# Patient Record
Sex: FEMALE | Race: WHITE | Employment: UNEMPLOYED | URBAN - METROPOLITAN AREA
[De-identification: names, ages, dates, MRNs, and addresses within clinical notes are randomized per-mention and may not be internally consistent; named-entity substitution may affect disease eponyms.]

---

## 2017-01-21 ENCOUNTER — OFFICE VISIT (OUTPATIENT)
Dept: OBGYN CLINIC | Facility: CLINIC | Age: 22
End: 2017-01-21

## 2017-01-21 ENCOUNTER — APPOINTMENT (OUTPATIENT)
Dept: LAB | Facility: HOSPITAL | Age: 22
End: 2017-01-21
Attending: OBSTETRICS & GYNECOLOGY
Payer: COMMERCIAL

## 2017-01-21 VITALS — DIASTOLIC BLOOD PRESSURE: 76 MMHG | SYSTOLIC BLOOD PRESSURE: 131 MMHG

## 2017-01-21 DIAGNOSIS — R10.2 PELVIC PAIN: ICD-10-CM

## 2017-01-21 DIAGNOSIS — R10.2 PELVIC PAIN: Primary | ICD-10-CM

## 2017-01-21 DIAGNOSIS — N92.6 MISSED MENSES: ICD-10-CM

## 2017-01-21 PROBLEM — N30.90 CYSTITIS: Status: ACTIVE | Noted: 2017-01-21

## 2017-01-21 LAB
APPEARANCE: CLEAR
BACTERIA UR QL AUTO: NEGATIVE /HPF
BILIRUB UR QL: NEGATIVE
BILIRUBIN: NEGATIVE
COLOR UR: YELLOW
CONTROL LINE PRESENT WITH A CLEAR BACKGROUND (YES/NO): YES YES/NO
GLUCOSE (URINE DIPSTICK): NEGATIVE MG/DL
GLUCOSE UR-MCNC: NEGATIVE MG/DL
KETONES (URINE DIPSTICK): NEGATIVE MG/DL
KETONES UR-MCNC: NEGATIVE MG/DL
KIT LOT #: NORMAL NUMERIC
MULTISTIX LOT#: NORMAL NUMERIC
NITRITE UR QL STRIP.AUTO: NEGATIVE
NITRITE, URINE: NEGATIVE
PH UR: 6 [PH] (ref 5–8)
PH, URINE: 6.5 (ref 4.5–8)
PREGNANCY TEST, URINE: POSITIVE
PROT UR-MCNC: NEGATIVE MG/DL
PROTEIN (URINE DIPSTICK): NEGATIVE MG/DL
RBC #/AREA URNS AUTO: 5 /HPF
SP GR UR STRIP: 1.01 (ref 1–1.03)
SPECIFIC GRAVITY: 1.01 (ref 1–1.03)
URINE-COLOR: YELLOW
UROBILINOGEN UR STRIP-ACNC: <2
UROBILINOGEN,SEMI-QN: 0 MG/DL (ref 0–1.9)
VIT C UR-MCNC: NEGATIVE MG/DL
WBC #/AREA URNS AUTO: 4 /HPF

## 2017-01-21 PROCEDURE — 87186 SC STD MICRODIL/AGAR DIL: CPT

## 2017-01-21 PROCEDURE — 81025 URINE PREGNANCY TEST: CPT | Performed by: OBSTETRICS & GYNECOLOGY

## 2017-01-21 PROCEDURE — 81001 URINALYSIS AUTO W/SCOPE: CPT

## 2017-01-21 PROCEDURE — 81002 URINALYSIS NONAUTO W/O SCOPE: CPT | Performed by: OBSTETRICS & GYNECOLOGY

## 2017-01-21 PROCEDURE — 99213 OFFICE O/P EST LOW 20 MIN: CPT | Performed by: OBSTETRICS & GYNECOLOGY

## 2017-01-21 PROCEDURE — 87086 URINE CULTURE/COLONY COUNT: CPT

## 2017-01-21 PROCEDURE — 87077 CULTURE AEROBIC IDENTIFY: CPT

## 2017-01-21 NOTE — PROGRESS NOTES
HPI:    Patient ID: Lopez Horan is a 24year old female. HPI  3 month follow-up visit  Had acute cystitis at her last visit and was complaining of dyspareunia and coital bleeding. Says that she felt much better and the symptoms resolved.   Rece

## 2017-01-23 ENCOUNTER — TELEPHONE (OUTPATIENT)
Dept: OBGYN CLINIC | Facility: CLINIC | Age: 22
End: 2017-01-23

## 2017-01-23 RX ORDER — NITROFURANTOIN 25; 75 MG/1; MG/1
100 CAPSULE ORAL 2 TIMES DAILY
Qty: 10 CAPSULE | Refills: 0 | Status: SHIPPED | OUTPATIENT
Start: 2017-01-23 | End: 2017-01-30

## 2017-01-23 NOTE — TELEPHONE ENCOUNTER
Please inform patient that her urine culture shows that the bacteria causing her urinary tract infection is not sensitive to Bactrim. She will need to stop and take a different antibiotic. This has been sent to her pharmacy.

## 2017-01-24 RX ORDER — ASCORBIC ACID, CHOLECALCIFEROL, .ALPHA.-TOCOPHEROL, DL-, PYRIDOXINE HYDROCHLORIDE, FOLIC ACID, CYANOCOBALAMIN, CALCIUM CARBONATE, FERROUS FUMARATE, MAGNESIUM OXIDE AND DOCONEXENT 90; 220; 10; 26; 1; 13; 145; 28; 50; 300 MG/1; [IU]/1; [IU]/1; MG/1; MG/1; UG/1; MG/1; MG/1; MG/1; MG/1
1 CAPSULE, GELATIN COATED ORAL DAILY
Qty: 30 CAPSULE | Refills: 11 | Status: SHIPPED | OUTPATIENT
Start: 2017-01-24 | End: 2017-02-23

## 2017-01-25 ENCOUNTER — TELEPHONE (OUTPATIENT)
Dept: OBGYN CLINIC | Facility: CLINIC | Age: 22
End: 2017-01-25

## 2017-01-25 NOTE — TELEPHONE ENCOUNTER
Anita Ndiaye MD  [facility]                   Please inform urinalysis suggests UTI.  Recommend waiting urine culture as she is early pregnant in order to confirm infection versus contamination.

## 2017-02-02 ENCOUNTER — TELEPHONE (OUTPATIENT)
Dept: OBGYN CLINIC | Facility: CLINIC | Age: 22
End: 2017-02-02

## 2017-03-07 ENCOUNTER — TELEPHONE (OUTPATIENT)
Dept: OBGYN CLINIC | Facility: CLINIC | Age: 22
End: 2017-03-07

## 2017-03-07 NOTE — TELEPHONE ENCOUNTER
Pt was at the er last night, was told her  baby has not heart beat, 10 weeks pregnant, has cramps.  Pt is crying, would like to start the process asap

## 2017-03-08 ENCOUNTER — OFFICE VISIT (OUTPATIENT)
Dept: OBGYN CLINIC | Facility: CLINIC | Age: 22
End: 2017-03-08

## 2017-03-08 VITALS — WEIGHT: 165 LBS | HEART RATE: 71 BPM | SYSTOLIC BLOOD PRESSURE: 119 MMHG | DIASTOLIC BLOOD PRESSURE: 72 MMHG

## 2017-03-08 DIAGNOSIS — O02.1 MISSED ABORTION: Primary | ICD-10-CM

## 2017-03-08 PROCEDURE — 99213 OFFICE O/P EST LOW 20 MIN: CPT | Performed by: OBSTETRICS & GYNECOLOGY

## 2017-03-08 RX ORDER — CEPHALEXIN 500 MG/1
500 CAPSULE ORAL 4 TIMES DAILY
COMMUNITY
End: 2017-03-17 | Stop reason: ALTCHOICE

## 2017-03-08 RX ORDER — NITROFURANTOIN 25; 75 MG/1; MG/1
100 CAPSULE ORAL 2 TIMES DAILY
COMMUNITY
End: 2017-03-17 | Stop reason: ALTCHOICE

## 2017-03-08 NOTE — PROGRESS NOTES
HPI:    Patient ID: Serene Clement is a 24year old female. HPI  Emergency room follow-up  Primigravida approximately 10-11 weeks gestational age who had confirmed an early pregnancy in January but did not return for ongoing prenatal care with us.

## 2017-03-10 ENCOUNTER — TELEPHONE (OUTPATIENT)
Dept: OBGYN CLINIC | Facility: CLINIC | Age: 22
End: 2017-03-10

## 2017-03-10 ENCOUNTER — APPOINTMENT (OUTPATIENT)
Dept: LAB | Facility: HOSPITAL | Age: 22
End: 2017-03-10
Attending: OBSTETRICS & GYNECOLOGY
Payer: COMMERCIAL

## 2017-03-10 DIAGNOSIS — O02.1 MISSED ABORTION: ICD-10-CM

## 2017-03-10 DIAGNOSIS — O02.1 MISSED ABORTION: Primary | ICD-10-CM

## 2017-03-10 LAB
ANTIBODY SCREEN: NEGATIVE
RH BLOOD TYPE: POSITIVE

## 2017-03-10 PROCEDURE — 86901 BLOOD TYPING SEROLOGIC RH(D): CPT

## 2017-03-10 PROCEDURE — 86900 BLOOD TYPING SEROLOGIC ABO: CPT

## 2017-03-10 PROCEDURE — 86850 RBC ANTIBODY SCREEN: CPT

## 2017-03-10 PROCEDURE — 36415 COLL VENOUS BLD VENIPUNCTURE: CPT

## 2017-03-10 NOTE — TELEPHONE ENCOUNTER
Note we requested records from ER visit [facility]. We needed the results same day in order to determine blood type and other information. I do not see that they were faxed.   If we cannot get ABO/Rh information today, patient will need t

## 2017-03-10 NOTE — TELEPHONE ENCOUNTER
Since records were never received, ordered blood tests and informed pt via interpretor she needs to go today for blood work, Patient informed and verbalized understanding.

## 2017-03-16 ENCOUNTER — OFFICE VISIT (OUTPATIENT)
Dept: OBGYN CLINIC | Facility: CLINIC | Age: 22
End: 2017-03-16

## 2017-03-16 VITALS — DIASTOLIC BLOOD PRESSURE: 84 MMHG | SYSTOLIC BLOOD PRESSURE: 126 MMHG | HEART RATE: 86 BPM | WEIGHT: 166 LBS

## 2017-03-16 DIAGNOSIS — O02.1 MISSED ABORTION: Primary | ICD-10-CM

## 2017-03-16 PROCEDURE — 99213 OFFICE O/P EST LOW 20 MIN: CPT | Performed by: OBSTETRICS & GYNECOLOGY

## 2017-03-16 PROCEDURE — 81025 URINE PREGNANCY TEST: CPT | Performed by: OBSTETRICS & GYNECOLOGY

## 2017-03-16 NOTE — PROGRESS NOTES
HPI:    Patient ID: Kaley Client is a 24year old female. HPI  F/u missed ab. Rh positive  States passed tissue and clots 5 days ago. Cramping and bleeding have stopped.   Review of Systems           Current Outpatient Prescriptions:  Jabier Mcgrath

## 2017-03-17 ENCOUNTER — APPOINTMENT (OUTPATIENT)
Dept: LAB | Facility: HOSPITAL | Age: 22
End: 2017-03-17
Attending: OBSTETRICS & GYNECOLOGY
Payer: COMMERCIAL

## 2017-03-17 ENCOUNTER — OFFICE VISIT (OUTPATIENT)
Dept: OBGYN CLINIC | Facility: CLINIC | Age: 22
End: 2017-03-17

## 2017-03-17 VITALS — DIASTOLIC BLOOD PRESSURE: 64 MMHG | SYSTOLIC BLOOD PRESSURE: 100 MMHG

## 2017-03-17 DIAGNOSIS — O03.9 SPONTANEOUS MISCARRIAGE: Primary | ICD-10-CM

## 2017-03-17 DIAGNOSIS — O03.9 SPONTANEOUS MISCARRIAGE: ICD-10-CM

## 2017-03-17 LAB — B-HCG SERPL-ACNC: 232.4 MIU/ML

## 2017-03-17 PROCEDURE — 76857 US EXAM PELVIC LIMITED: CPT | Performed by: OBSTETRICS & GYNECOLOGY

## 2017-03-17 PROCEDURE — 84702 CHORIONIC GONADOTROPIN TEST: CPT

## 2017-03-17 PROCEDURE — 99212 OFFICE O/P EST SF 10 MIN: CPT | Performed by: OBSTETRICS & GYNECOLOGY

## 2017-03-17 PROCEDURE — 36415 COLL VENOUS BLD VENIPUNCTURE: CPT

## 2017-03-17 NOTE — PROGRESS NOTES
HPI:    Patient ID: Serene Clement is a 24year old female. HPI  Feeling well. Denies cramping. Noted very slight vaginal brown spotting after exam yesterday. Presents for transvaginal ultrasound post miscarriage.   Had positive pregnancy test y

## 2017-03-25 ENCOUNTER — LAB ENCOUNTER (OUTPATIENT)
Dept: LAB | Facility: HOSPITAL | Age: 22
End: 2017-03-25
Attending: OBSTETRICS & GYNECOLOGY
Payer: COMMERCIAL

## 2017-03-25 DIAGNOSIS — O03.9 SPONTANEOUS MISCARRIAGE: ICD-10-CM

## 2017-03-25 LAB
B-HCG SERPL-ACNC: 29.4 MIU/ML
B-HCG UR QL: NEGATIVE

## 2017-03-25 PROCEDURE — 36415 COLL VENOUS BLD VENIPUNCTURE: CPT

## 2017-03-25 PROCEDURE — 84702 CHORIONIC GONADOTROPIN TEST: CPT

## 2017-03-25 PROCEDURE — 81025 URINE PREGNANCY TEST: CPT

## 2017-03-27 ENCOUNTER — TELEPHONE (OUTPATIENT)
Dept: OBGYN CLINIC | Facility: CLINIC | Age: 22
End: 2017-03-27

## 2017-03-27 DIAGNOSIS — O03.9 SAB (SPONTANEOUS ABORTION): Primary | ICD-10-CM

## 2017-03-27 NOTE — TELEPHONE ENCOUNTER
----- Message from Arlene Maloney MD sent at 3/27/2017  2:06 PM CDT -----  Please inform q bhcg level is low but not negative yet.   Repeat in one week until neg

## 2017-03-28 LAB
CONTROL LINE PRESENT WITH A CLEAR BACKGROUND (YES/NO): YES YES/NO
KIT LOT #: NORMAL NUMERIC
PREGNANCY TEST, URINE: POSITIVE

## 2017-03-28 NOTE — TELEPHONE ENCOUNTER
Pt called using Nicaraguan phone line , Nayla Montgomery #[phone]. Informed pt that her bchg level from 03/25 was low, but not negative. Informed pt she needs to have hcgb test repeated in 1 week (04/01/2017) at [address] [facility] [address] for Health lab.  Pt verbalizes understanding

## 2017-04-01 ENCOUNTER — APPOINTMENT (OUTPATIENT)
Dept: LAB | Facility: HOSPITAL | Age: 22
End: 2017-04-01
Attending: OBSTETRICS & GYNECOLOGY
Payer: COMMERCIAL

## 2017-04-01 DIAGNOSIS — O03.9 SAB (SPONTANEOUS ABORTION): ICD-10-CM

## 2017-04-01 PROCEDURE — 84702 CHORIONIC GONADOTROPIN TEST: CPT

## 2017-04-01 PROCEDURE — 36415 COLL VENOUS BLD VENIPUNCTURE: CPT

## 2017-04-03 DIAGNOSIS — O03.9 SPONTANEOUS ABORTION IN FIRST TRIMESTER: Primary | ICD-10-CM

## 2017-05-23 ENCOUNTER — TELEPHONE (OUTPATIENT)
Dept: OBGYN CLINIC | Facility: CLINIC | Age: 22
End: 2017-05-23

## 2018-03-05 ENCOUNTER — OFFICE VISIT (OUTPATIENT)
Dept: OBGYN CLINIC | Facility: CLINIC | Age: 23
End: 2018-03-05

## 2018-03-05 ENCOUNTER — APPOINTMENT (OUTPATIENT)
Dept: LAB | Facility: HOSPITAL | Age: 23
End: 2018-03-05
Attending: OBSTETRICS & GYNECOLOGY
Payer: COMMERCIAL

## 2018-03-05 VITALS
HEIGHT: 64 IN | BODY MASS INDEX: 31.24 KG/M2 | SYSTOLIC BLOOD PRESSURE: 118 MMHG | DIASTOLIC BLOOD PRESSURE: 74 MMHG | WEIGHT: 183 LBS

## 2018-03-05 DIAGNOSIS — N92.6 MISSED MENSES: ICD-10-CM

## 2018-03-05 DIAGNOSIS — O03.9 SPONTANEOUS ABORTION IN FIRST TRIMESTER: ICD-10-CM

## 2018-03-05 DIAGNOSIS — N92.6 MISSED MENSES: Primary | ICD-10-CM

## 2018-03-05 LAB
B-HCG SERPL-ACNC: 9009 MIU/ML
CONTROL LINE PRESENT WITH A CLEAR BACKGROUND (YES/NO): YES YES/NO
KIT LOT #: NORMAL NUMERIC
PREGNANCY TEST, URINE: POSITIVE

## 2018-03-05 PROCEDURE — 99213 OFFICE O/P EST LOW 20 MIN: CPT | Performed by: ADVANCED PRACTICE MIDWIFE

## 2018-03-05 PROCEDURE — 36415 COLL VENOUS BLD VENIPUNCTURE: CPT

## 2018-03-05 PROCEDURE — 87086 URINE CULTURE/COLONY COUNT: CPT

## 2018-03-05 PROCEDURE — 84702 CHORIONIC GONADOTROPIN TEST: CPT

## 2018-03-05 PROCEDURE — 81025 URINE PREGNANCY TEST: CPT | Performed by: ADVANCED PRACTICE MIDWIFE

## 2018-03-05 NOTE — PROGRESS NOTES
HPI:    Patient ID: Kelsie Car is a 20year old female. . LMP 18 6 wk 2 days SHAHEED 10/27/18. Is having some nose bleeds, mild cramps and spotting pink yesterday. Gets some headaches.   Does not have N&V or tiredness or breast tend

## 2018-03-06 ENCOUNTER — TELEPHONE (OUTPATIENT)
Dept: OBGYN CLINIC | Facility: CLINIC | Age: 23
End: 2018-03-06

## 2018-03-06 NOTE — TELEPHONE ENCOUNTER
Please inform her blood pregnancy hormone test , qbhcg is in a healthy level. Please schedule for OB u/s 1 in office with me, next available opening.

## 2018-03-15 ENCOUNTER — TELEPHONE (OUTPATIENT)
Dept: OBGYN CLINIC | Facility: CLINIC | Age: 23
End: 2018-03-15

## 2018-03-15 NOTE — TELEPHONE ENCOUNTER
Give her appt with me tomorrow morning 9 am.  If she doesn't want to wait, can order stat hold and call u/s through [address] [place] [address] but will be done by tech and not in office setting.

## 2018-03-15 NOTE — TELEPHONE ENCOUNTER
Per pt wondering if she can be seen for her US with someone else. States that two days ago she was spotting but did not call due to apt today with QUIRINO.

## 2018-03-15 NOTE — TELEPHONE ENCOUNTER
Called pt to r/s appt due to Dr Martha Ortiz not being in office (2 pts in labor). Dr Martha Ortiz has no availabity for u/s until next week. Pt would like to know if she can have u/s done at hospital due to bleeding during pregnancy.

## 2018-03-16 ENCOUNTER — OFFICE VISIT (OUTPATIENT)
Dept: OBGYN CLINIC | Facility: CLINIC | Age: 23
End: 2018-03-16

## 2018-03-16 ENCOUNTER — APPOINTMENT (OUTPATIENT)
Dept: LAB | Facility: HOSPITAL | Age: 23
End: 2018-03-16
Attending: OBSTETRICS & GYNECOLOGY
Payer: COMMERCIAL

## 2018-03-16 VITALS — SYSTOLIC BLOOD PRESSURE: 122 MMHG | DIASTOLIC BLOOD PRESSURE: 70 MMHG

## 2018-03-16 DIAGNOSIS — O20.0 THREATENED ABORTION, ANTEPARTUM: ICD-10-CM

## 2018-03-16 DIAGNOSIS — N92.6 MISSED MENSES: Primary | ICD-10-CM

## 2018-03-16 DIAGNOSIS — O03.9 SPONTANEOUS MISCARRIAGE: ICD-10-CM

## 2018-03-16 LAB — B-HCG SERPL-ACNC: NORMAL MIU/ML

## 2018-03-16 PROCEDURE — 84702 CHORIONIC GONADOTROPIN TEST: CPT

## 2018-03-16 PROCEDURE — 36415 COLL VENOUS BLD VENIPUNCTURE: CPT

## 2018-03-16 PROCEDURE — 76817 TRANSVAGINAL US OBSTETRIC: CPT | Performed by: OBSTETRICS & GYNECOLOGY

## 2018-03-16 PROCEDURE — 99212 OFFICE O/P EST SF 10 MIN: CPT | Performed by: OBSTETRICS & GYNECOLOGY

## 2018-03-16 NOTE — PROGRESS NOTES
HPI:    Patient ID: Sarah Barrera is a 20year old female. HPI  For early u/s 1. Had normal qbhcg 9000. Hx of miscarriage w first preg. Scant brown spotting on wiping 2 days ago. No cramping or continued bleeding.   US shows 6 4/7 wks single l

## 2018-03-23 ENCOUNTER — NURSE ONLY (OUTPATIENT)
Dept: OBGYN CLINIC | Facility: CLINIC | Age: 23
End: 2018-03-23

## 2018-03-23 ENCOUNTER — LAB ENCOUNTER (OUTPATIENT)
Dept: LAB | Facility: HOSPITAL | Age: 23
End: 2018-03-23
Attending: OBSTETRICS & GYNECOLOGY
Payer: COMMERCIAL

## 2018-03-23 ENCOUNTER — TELEPHONE (OUTPATIENT)
Dept: OBGYN CLINIC | Facility: CLINIC | Age: 23
End: 2018-03-23

## 2018-03-23 DIAGNOSIS — O20.0 THREATENED ABORTION, ANTEPARTUM: ICD-10-CM

## 2018-03-23 DIAGNOSIS — Z34.81 ENCOUNTER FOR SUPERVISION OF OTHER NORMAL PREGNANCY IN FIRST TRIMESTER: ICD-10-CM

## 2018-03-23 DIAGNOSIS — L81.8 DECORATIVE TATTOO: ICD-10-CM

## 2018-03-23 DIAGNOSIS — Z34.81 ENCOUNTER FOR SUPERVISION OF OTHER NORMAL PREGNANCY IN FIRST TRIMESTER: Primary | ICD-10-CM

## 2018-03-23 LAB
ANTIBODY SCREEN: NEGATIVE
BASOPHILS # BLD: 0 K/UL (ref 0–0.2)
BASOPHILS NFR BLD: 0 %
BILIRUB UR QL: NEGATIVE
COLOR UR: YELLOW
EOSINOPHIL # BLD: 0.2 K/UL (ref 0–0.7)
EOSINOPHIL NFR BLD: 2 %
ERYTHROCYTE [DISTWIDTH] IN BLOOD BY AUTOMATED COUNT: 13.2 % (ref 11–15)
GLUCOSE UR-MCNC: NEGATIVE MG/DL
HBV SURFACE AG SERPL QL IA: NONREACTIVE
HCT VFR BLD AUTO: 38.7 % (ref 35–48)
HCV AB SERPL QL IA: NONREACTIVE
HGB BLD-MCNC: 13.1 G/DL (ref 12–16)
HIV1+2 AB SERPL QL IA: NONREACTIVE
KETONES UR-MCNC: NEGATIVE MG/DL
LYMPHOCYTES # BLD: 1.5 K/UL (ref 1–4)
LYMPHOCYTES NFR BLD: 17 %
MCH RBC QN AUTO: 29.5 PG (ref 27–32)
MCHC RBC AUTO-ENTMCNC: 33.9 G/DL (ref 32–37)
MCV RBC AUTO: 87.2 FL (ref 80–100)
MONOCYTES # BLD: 0.5 K/UL (ref 0–1)
MONOCYTES NFR BLD: 5 %
NEUTROPHILS # BLD AUTO: 6.6 K/UL (ref 1.8–7.7)
NEUTROPHILS NFR BLD: 76 %
NITRITE UR QL STRIP.AUTO: NEGATIVE
PH UR: 6 [PH] (ref 5–8)
PLATELET # BLD AUTO: 203 K/UL (ref 140–400)
PMV BLD AUTO: 8.2 FL (ref 7.4–10.3)
PROGEST SERPL-MCNC: 11.5 NG/ML
PROT UR-MCNC: 100 MG/DL
RBC # BLD AUTO: 4.44 M/UL (ref 3.7–5.4)
RBC #/AREA URNS AUTO: 53 /HPF
RH BLOOD TYPE: POSITIVE
RUBV IGG SER-ACNC: 53.5 IU/ML
SP GR UR STRIP: 1.03 (ref 1–1.03)
T PALLIDUM AB SER QL: NEGATIVE
UROBILINOGEN UR STRIP-ACNC: 2
VIT C UR-MCNC: 40 MG/DL
WBC # BLD AUTO: 8.7 K/UL (ref 4–11)
WBC #/AREA URNS AUTO: 67 /HPF

## 2018-03-23 PROCEDURE — 84144 ASSAY OF PROGESTERONE: CPT

## 2018-03-23 PROCEDURE — 36415 COLL VENOUS BLD VENIPUNCTURE: CPT

## 2018-03-23 PROCEDURE — 87086 URINE CULTURE/COLONY COUNT: CPT

## 2018-03-23 PROCEDURE — 86780 TREPONEMA PALLIDUM: CPT

## 2018-03-23 PROCEDURE — 86901 BLOOD TYPING SEROLOGIC RH(D): CPT

## 2018-03-23 PROCEDURE — 85025 COMPLETE CBC W/AUTO DIFF WBC: CPT

## 2018-03-23 PROCEDURE — 81001 URINALYSIS AUTO W/SCOPE: CPT

## 2018-03-23 PROCEDURE — 86762 RUBELLA ANTIBODY: CPT

## 2018-03-23 PROCEDURE — 86803 HEPATITIS C AB TEST: CPT

## 2018-03-23 PROCEDURE — 86850 RBC ANTIBODY SCREEN: CPT

## 2018-03-23 PROCEDURE — 87389 HIV-1 AG W/HIV-1&-2 AB AG IA: CPT

## 2018-03-23 PROCEDURE — 87340 HEPATITIS B SURFACE AG IA: CPT

## 2018-03-23 PROCEDURE — 86900 BLOOD TYPING SEROLOGIC ABO: CPT

## 2018-03-23 RX ORDER — VITAMIN A ACETATE, BETA CAROTENE, ASCORBIC ACID, CHOLECALCIFEROL, .ALPHA.-TOCOPHEROL ACETATE, DL-, THIAMINE MONONITRATE, RIBOFLAVIN, NIACINAMIDE, PYRIDOXINE HYDROCHLORIDE, FOLIC ACID, CYANOCOBALAMIN, CALCIUM CARBONATE, FERROUS FUMARATE, ZINC OXIDE, CUPRIC OXIDE 3080; 12; 120; 400; 1; 1.84; 3; 20; 22; 920; 25; 200; 27; 10; 2 [IU]/1; UG/1; MG/1; [IU]/1; MG/1; MG/1; MG/1; MG/1; MG/1; [IU]/1; MG/1; MG/1; MG/1; MG/1; MG/1
1 TABLET, FILM COATED ORAL DAILY
COMMUNITY
End: 2020-01-21

## 2018-03-23 NOTE — TELEPHONE ENCOUNTER
Patient's insurance, [facility] and Medicaid, does not require referral or authorization for Maternal Fetal Medicine. Thank you.

## 2018-03-23 NOTE — PROGRESS NOTES
Pt is here today for RN [employer] Education.  Educational material reviewed with patient: Prenatal care, nutrition, weight gain recommendations, travel, exercise, intercourse, pregnancy changes, safe medications, pregnancy and work, fetal movement, labor and prete

## 2018-03-27 ENCOUNTER — TELEPHONE (OUTPATIENT)
Dept: OBGYN CLINIC | Facility: CLINIC | Age: 23
End: 2018-03-27

## 2018-03-27 NOTE — TELEPHONE ENCOUNTER
Per pt CRINONE cream isn't covered by insurance and was told to call clinic to get a PA.  Please advise

## 2018-03-28 NOTE — TELEPHONE ENCOUNTER
Your information has been submitted to [facility]. If [facility] has not responded in 3-5 business days or if you have any questions about your PA submission, contact [place] Medicaid at [phone].

## 2018-03-28 NOTE — TELEPHONE ENCOUNTER
Grisel Sanchezhuerta Braun: [id number]      Crinone 8% gel has been REJECTED by insurance. Non-preferred agents typically have a higher patient co-pay than health insurance plan preferred agents. Please research and evaluate therapy options for this patient.

## 2018-03-28 NOTE — TELEPHONE ENCOUNTER
Per pt states that pharmacy informed her that a PA is needed in order for medication to be covered Advised pt that if the PA is needed then we would start one and get back to her.

## 2018-03-29 NOTE — TELEPHONE ENCOUNTER
Crinone is currently not being covered by pt's insurance, pt is requesting to see if there is another rx available that is similar that could be sent in?

## 2018-03-29 NOTE — TELEPHONE ENCOUNTER
Per pharmacist pt picked up prescription and paid the $400 for medication. Informed pharmacist that PA was placed for this medication and if approved to let them know so they can get a hold of insurance.

## 2018-03-29 NOTE — TELEPHONE ENCOUNTER
May send in Generic Progesterone suppositories per vagina BID. Check with Pharmacist dose equivalent to Crinone.

## 2018-04-02 ENCOUNTER — TELEPHONE (OUTPATIENT)
Dept: OBGYN CLINIC | Facility: CLINIC | Age: 23
End: 2018-04-02

## 2018-04-02 NOTE — TELEPHONE ENCOUNTER
Somali phone line  #[phone] used to translate phone call. Pt voices when she places progesterone suppositories she feels period like cramping for a couple of hours after placement and then cramping goes away.  Pt denies any bleeding or any other

## 2018-04-02 NOTE — TELEPHONE ENCOUNTER
Cayman Islander phone line  #[phone] used to translate phone call. Informed pt of Dr. Lawanda Schultz message below. Pt voices understanding.

## 2018-04-03 NOTE — TELEPHONE ENCOUNTER
Fax received. Denial for reasons below. Pt has primary and it needs to go through them first.   Please inform pt. Letter off to scan.

## 2018-04-03 NOTE — TELEPHONE ENCOUNTER
Spoke to rep at [facility] p/a, and they denied the claim due pt having a primary insurance. They asked to bill her primary insurance first. They will fax us the denial letter.

## 2018-04-04 ENCOUNTER — TELEPHONE (OUTPATIENT)
Dept: OBGYN CLINIC | Facility: CLINIC | Age: 23
End: 2018-04-04

## 2018-04-04 NOTE — TELEPHONE ENCOUNTER
Pt. states that she is having vaginal bleeding and that it is alot and is 9 weeks pregnant, so she went to ER Dept at [facility] in [facility]. Pt states that she is taking suppositories, which caused cramping and now the bleeding. Pt.  Wants the Dr to

## 2018-04-05 ENCOUNTER — TELEPHONE (OUTPATIENT)
Dept: OBGYN CLINIC | Facility: CLINIC | Age: 23
End: 2018-04-05

## 2018-04-05 NOTE — TELEPHONE ENCOUNTER
Please follow up with patient. How is she doing. What did they do at [facility] ER and what did they say?

## 2018-04-05 NOTE — TELEPHONE ENCOUNTER
Pt states US was done and everything was within normal limits and nothing concerning was noted. Was informed of medication being sent to pharmacy as well as dc vaginal creams due to vaginal bleeding.

## 2018-04-05 NOTE — TELEPHONE ENCOUNTER
Pt informed to provide pharmacy with insurance information for primary and secondary insurance. Pt already paid for medication and will provide her pharmacy this information to try and be reimbursed. She had no further questions.

## 2018-04-05 NOTE — TELEPHONE ENCOUNTER
Per pt she was seen at [address]  [facility] in [facility] ER due to vag bleeding. Is 9w3d and O positive. Was given forms for miscarriage precautions.  Was given Clindamycin 2% vaginal supp, which she is to insert once daily and also keflex 500mg 4x daily for 7 da

## 2018-04-05 NOTE — TELEPHONE ENCOUNTER
Did she have and ultrasound? What were the results? She should hold off on any vaginal creams given the vaginal bleeding. She can take oral progesterone. ERx Prometrium 100 mg po daily. #20. If ultrasound normal, keep next appt with me.   If she does

## 2018-04-09 ENCOUNTER — TELEPHONE (OUTPATIENT)
Dept: OBGYN CLINIC | Facility: CLINIC | Age: 23
End: 2018-04-09

## 2018-04-09 NOTE — TELEPHONE ENCOUNTER
Per pt states that she is currently 10w0d, O positive. States she has been having some cramping for the past three days 6/10 pain at least every hour. States  she was seen in ER last Wednesday in [facility] due to heavy bleeding.  States that she

## 2018-04-09 NOTE — TELEPHONE ENCOUNTER
Pt is 10 wks pregnant, states still having light bleeding when wiping and cramping that comes and goes. pls adv.

## 2018-04-09 NOTE — TELEPHONE ENCOUNTER
As discussed w triage nurse. Try to schedule prob ob visit for tomorrow. Can always go to ER for severe cramping or heavy vag bleed.

## 2018-04-10 ENCOUNTER — OFFICE VISIT (OUTPATIENT)
Dept: OBGYN CLINIC | Facility: CLINIC | Age: 23
End: 2018-04-10

## 2018-04-10 VITALS
HEART RATE: 79 BPM | WEIGHT: 176.38 LBS | DIASTOLIC BLOOD PRESSURE: 80 MMHG | BODY MASS INDEX: 30 KG/M2 | SYSTOLIC BLOOD PRESSURE: 124 MMHG

## 2018-04-10 DIAGNOSIS — O20.0 THREATENED ABORTION, ANTEPARTUM: Primary | ICD-10-CM

## 2018-04-10 PROCEDURE — 76817 TRANSVAGINAL US OBSTETRIC: CPT | Performed by: OBSTETRICS & GYNECOLOGY

## 2018-04-10 PROCEDURE — 99213 OFFICE O/P EST LOW 20 MIN: CPT | Performed by: OBSTETRICS & GYNECOLOGY

## 2018-04-13 ENCOUNTER — INITIAL PRENATAL (OUTPATIENT)
Dept: OBGYN CLINIC | Facility: CLINIC | Age: 23
End: 2018-04-13

## 2018-04-13 VITALS
BODY MASS INDEX: 30 KG/M2 | HEART RATE: 72 BPM | SYSTOLIC BLOOD PRESSURE: 123 MMHG | DIASTOLIC BLOOD PRESSURE: 76 MMHG | WEIGHT: 172 LBS

## 2018-04-13 DIAGNOSIS — Z34.01 ENCOUNTER FOR SUPERVISION OF NORMAL FIRST PREGNANCY IN FIRST TRIMESTER: Primary | ICD-10-CM

## 2018-04-13 PROCEDURE — 81002 URINALYSIS NONAUTO W/O SCOPE: CPT | Performed by: OBSTETRICS & GYNECOLOGY

## 2018-04-13 NOTE — PROGRESS NOTES
Recent ultrasound 3 days ago showing single live viable intrauterine gestation with good development. Marginal placenta previa noted. Will defer pelvic exam, Pap, cervical cultures at this time. He is interested in first trimester screen and will order.

## 2018-04-16 ENCOUNTER — TELEPHONE (OUTPATIENT)
Dept: OBGYN CLINIC | Facility: CLINIC | Age: 23
End: 2018-04-16

## 2018-04-16 NOTE — TELEPHONE ENCOUNTER
11WKS- WENT TO [place] ER DUE TO BLEEDING AND PAIN. WAS TOLD THAT SHES OKAY. STILL HAS A BIT OF PAIN, NO MORE BLEEDING. WAS TOLD TO COME IN SOONER TO GET CHECKED.

## 2018-04-16 NOTE — TELEPHONE ENCOUNTER
Phone room asked if ok to make apt for pt earlier for ER visit. Since has an apt 5/15. Verbalized understanding.

## 2018-04-17 ENCOUNTER — OFFICE VISIT (OUTPATIENT)
Dept: OBGYN CLINIC | Facility: CLINIC | Age: 23
End: 2018-04-17

## 2018-04-17 VITALS — DIASTOLIC BLOOD PRESSURE: 60 MMHG | SYSTOLIC BLOOD PRESSURE: 102 MMHG

## 2018-04-17 DIAGNOSIS — O20.0 THREATENED ABORTION, ANTEPARTUM: ICD-10-CM

## 2018-04-17 DIAGNOSIS — N93.9 VAGINAL BLEEDING: Primary | ICD-10-CM

## 2018-04-17 DIAGNOSIS — R10.9 ABDOMINAL CRAMPS: ICD-10-CM

## 2018-04-17 PROCEDURE — 99214 OFFICE O/P EST MOD 30 MIN: CPT | Performed by: OBSTETRICS & GYNECOLOGY

## 2018-04-18 NOTE — PROGRESS NOTES
HPI:   Francheska Snow is a 20year old female who presents for a f/u visits due to an episode of bleeding and was seen in [facility] er, normal u/s per pt with viable pregnancy,  Since then no further bleeding and no pain at this time.    P 102/60   LMP 01/20/2018 (Exact Date)   Breastfeeding? No   There is no height or weight on file to calculate BMI.    GENERAL: well developed, well nourished,in no apparent distress  SKIN: no rashes,no suspicious lesions  HEENT: atraumatic, normocephalic  NE

## 2018-04-23 ENCOUNTER — OFFICE VISIT (OUTPATIENT)
Dept: OBGYN CLINIC | Facility: CLINIC | Age: 23
End: 2018-04-23

## 2018-04-23 VITALS — DIASTOLIC BLOOD PRESSURE: 62 MMHG | SYSTOLIC BLOOD PRESSURE: 114 MMHG | BODY MASS INDEX: 29 KG/M2 | WEIGHT: 169 LBS

## 2018-04-23 DIAGNOSIS — N93.9 VAGINAL BLEEDING: Primary | ICD-10-CM

## 2018-04-23 DIAGNOSIS — O20.0 THREATENED ABORTION: ICD-10-CM

## 2018-04-23 PROCEDURE — 76856 US EXAM PELVIC COMPLETE: CPT | Performed by: OBSTETRICS & GYNECOLOGY

## 2018-04-23 PROCEDURE — 99213 OFFICE O/P EST LOW 20 MIN: CPT | Performed by: OBSTETRICS & GYNECOLOGY

## 2018-04-23 NOTE — PROGRESS NOTES
HPI:   Eileen Strong is a 20year old female who presents for a hx of vaginal bleeding,  Pt was seen in [facility] recently and viable pregnancy noted per pt.       \ Wt Readings from Last 6 Encounters:  04/23/18 : 169 lb (76.7 kg)  04/13/ well developed, well nourished,in no apparent distress  SKIN: no rashes,no suspicious lesions  HEENT: atraumatic, normocephalic  NECK: supple,no adenopathy  CHEST: no chest tenderness  LUNGS: clear to auscultation  CARDIO: RRR without murmur  GI: good BS's

## 2018-04-25 ENCOUNTER — TELEPHONE (OUTPATIENT)
Dept: OBGYN CLINIC | Facility: CLINIC | Age: 23
End: 2018-04-25

## 2018-04-25 NOTE — TELEPHONE ENCOUNTER
Pt states she finished the [facility] and was told by [facility] that she didn't need to continue them. Pt just wanted to make sure with AJB.  Please advise Malay speaking

## 2018-04-26 PROCEDURE — 36415 COLL VENOUS BLD VENIPUNCTURE: CPT | Performed by: OBSTETRICS & GYNECOLOGY

## 2018-04-26 PROCEDURE — 81508 FTL CGEN ABNOR TWO PROTEINS: CPT | Performed by: OBSTETRICS & GYNECOLOGY

## 2018-05-15 ENCOUNTER — ROUTINE PRENATAL (OUTPATIENT)
Dept: OBGYN CLINIC | Facility: CLINIC | Age: 23
End: 2018-05-15

## 2018-05-15 VITALS — BODY MASS INDEX: 29 KG/M2 | WEIGHT: 169.19 LBS | DIASTOLIC BLOOD PRESSURE: 62 MMHG | SYSTOLIC BLOOD PRESSURE: 112 MMHG

## 2018-05-15 DIAGNOSIS — Z34.82 ENCOUNTER FOR SUPERVISION OF OTHER NORMAL PREGNANCY IN SECOND TRIMESTER: Primary | ICD-10-CM

## 2018-05-15 PROCEDURE — 81002 URINALYSIS NONAUTO W/O SCOPE: CPT | Performed by: OBSTETRICS & GYNECOLOGY

## 2018-05-22 ENCOUNTER — TELEPHONE (OUTPATIENT)
Dept: OBGYN CLINIC | Facility: CLINIC | Age: 23
End: 2018-05-22

## 2018-05-22 NOTE — TELEPHONE ENCOUNTER
Pt 16 weeks c/o lower back pain and dysuria x1 week. Deep Pain is in the mid-lower back, deep, constant, 7/10. Taking Tylenol 500 mg with minimal relief. Denied chills, fever, or other symptoms.      ASJ on call advised pt to go to ER, pt verbalized u

## 2018-05-22 NOTE — TELEPHONE ENCOUNTER
Pt is about 17 weeks a long and states she's experiencing back pain.  Please advise Albanian speaking

## 2018-05-23 ENCOUNTER — APPOINTMENT (OUTPATIENT)
Dept: LAB | Facility: HOSPITAL | Age: 23
End: 2018-05-23
Attending: OBSTETRICS & GYNECOLOGY
Payer: COMMERCIAL

## 2018-05-23 ENCOUNTER — ROUTINE PRENATAL (OUTPATIENT)
Dept: OBGYN CLINIC | Facility: CLINIC | Age: 23
End: 2018-05-23

## 2018-05-23 VITALS
HEART RATE: 73 BPM | SYSTOLIC BLOOD PRESSURE: 93 MMHG | DIASTOLIC BLOOD PRESSURE: 56 MMHG | WEIGHT: 169 LBS | BODY MASS INDEX: 29 KG/M2

## 2018-05-23 DIAGNOSIS — Z34.92 NORMAL PREGNANCY IN SECOND TRIMESTER: Primary | ICD-10-CM

## 2018-05-23 DIAGNOSIS — Z34.82 ENCOUNTER FOR SUPERVISION OF OTHER NORMAL PREGNANCY IN SECOND TRIMESTER: ICD-10-CM

## 2018-05-23 DIAGNOSIS — Z34.92 NORMAL PREGNANCY IN SECOND TRIMESTER: ICD-10-CM

## 2018-05-23 PROCEDURE — 81001 URINALYSIS AUTO W/SCOPE: CPT

## 2018-05-23 PROCEDURE — 36415 COLL VENOUS BLD VENIPUNCTURE: CPT

## 2018-05-23 PROCEDURE — 87077 CULTURE AEROBIC IDENTIFY: CPT

## 2018-05-23 PROCEDURE — 81002 URINALYSIS NONAUTO W/O SCOPE: CPT | Performed by: OBSTETRICS & GYNECOLOGY

## 2018-05-23 PROCEDURE — 82105 ALPHA-FETOPROTEIN SERUM: CPT

## 2018-05-23 PROCEDURE — 87086 URINE CULTURE/COLONY COUNT: CPT

## 2018-05-23 RX ORDER — NITROFURANTOIN 25; 75 MG/1; MG/1
100 CAPSULE ORAL 2 TIMES DAILY
Qty: 14 CAPSULE | Refills: 0 | Status: SHIPPED | OUTPATIENT
Start: 2018-05-23 | End: 2018-05-30

## 2018-05-23 NOTE — PROGRESS NOTES
Problem visit:  F/U from ER. Urine Dip today shows + Nitrites and + Leuks. Was not treated in ER. Rx:  Macrobid 100 mg BID x 7 days. To go to lab for U/A C & S now. Also to do MSAFP today. Patient to call if symptoms worsen or develops fever.   Keep r

## 2018-05-23 NOTE — TELEPHONE ENCOUNTER
Pt voices she was seen at [address] yesterday. See 3/22 phone message. Pt voices all testing at that visit came back negative, but pt was told to schedule a follow up visit with her OB.  Appointment made for pt to see mlm today at 3:40 pm.

## 2018-06-06 ENCOUNTER — TELEPHONE (OUTPATIENT)
Dept: OBGYN CLINIC | Facility: CLINIC | Age: 23
End: 2018-06-06

## 2018-06-06 ENCOUNTER — LAB ENCOUNTER (OUTPATIENT)
Dept: LAB | Facility: HOSPITAL | Age: 23
End: 2018-06-06
Attending: OBSTETRICS & GYNECOLOGY
Payer: MEDICAID

## 2018-06-06 DIAGNOSIS — R30.0 DYSURIA: ICD-10-CM

## 2018-06-06 DIAGNOSIS — R30.0 DYSURIA: Primary | ICD-10-CM

## 2018-06-06 PROCEDURE — 87086 URINE CULTURE/COLONY COUNT: CPT

## 2018-06-06 PROCEDURE — 81001 URINALYSIS AUTO W/SCOPE: CPT

## 2018-06-06 NOTE — TELEPHONE ENCOUNTER
Per pt states that she is currently 18wks. States she was advised by MLM to call if she experienced any pain or change in symptoms due to urinary symptoms. States she has been feeling pressure when she urinates and also when she tries to walk around.  Has s

## 2018-06-06 NOTE — TELEPHONE ENCOUNTER
18wks- pt states that she feels vaginal pressure, discomfort when she walks, also feels vaginal swelling.

## 2018-06-07 ENCOUNTER — TELEPHONE (OUTPATIENT)
Dept: OBGYN CLINIC | Facility: CLINIC | Age: 23
End: 2018-06-07

## 2018-06-07 NOTE — TELEPHONE ENCOUNTER
----- Message from Marlen Henley MD sent at 6/6/2018 10:13 PM CDT -----  U/A does not show any sign of infection but Urine Culture is still pending. Will wait for final culture. Notify patient.

## 2018-06-14 NOTE — TELEPHONE ENCOUNTER
Patient says she went to [facility] last night and they did not hear the baby's heartbeat. Appointment made with Dr. Joy Velazquez for tomorrow. Patient instructed to bring any paperwork she received in ER to her OV tomorrow. Verbalizes understanding. 14-Jun-2018 13:56

## 2018-06-15 ENCOUNTER — ROUTINE PRENATAL (OUTPATIENT)
Dept: OBGYN CLINIC | Facility: CLINIC | Age: 23
End: 2018-06-15

## 2018-06-15 VITALS — WEIGHT: 170 LBS | DIASTOLIC BLOOD PRESSURE: 68 MMHG | BODY MASS INDEX: 29 KG/M2 | SYSTOLIC BLOOD PRESSURE: 115 MMHG

## 2018-06-15 DIAGNOSIS — Z34.02 ENCOUNTER FOR SUPERVISION OF NORMAL FIRST PREGNANCY IN SECOND TRIMESTER: Primary | ICD-10-CM

## 2018-06-15 PROBLEM — O02.1 MISSED ABORTION (HCC): Status: RESOLVED | Noted: 2017-03-08 | Resolved: 2018-06-15

## 2018-06-15 PROBLEM — O20.0 THREATENED ABORTION, ANTEPARTUM (HCC): Status: RESOLVED | Noted: 2018-03-16 | Resolved: 2018-06-15

## 2018-06-15 PROBLEM — O20.0 THREATENED ABORTION, ANTEPARTUM: Status: RESOLVED | Noted: 2018-03-16 | Resolved: 2018-06-15

## 2018-06-15 PROBLEM — O02.1 MISSED ABORTION: Status: RESOLVED | Noted: 2017-03-08 | Resolved: 2018-06-15

## 2018-06-15 PROBLEM — O03.9 SPONTANEOUS MISCARRIAGE (HCC): Status: RESOLVED | Noted: 2017-03-17 | Resolved: 2018-06-15

## 2018-06-15 PROBLEM — N92.6 MISSED MENSES: Status: RESOLVED | Noted: 2018-03-16 | Resolved: 2018-06-15

## 2018-06-15 PROBLEM — O03.9 SPONTANEOUS MISCARRIAGE: Status: RESOLVED | Noted: 2017-03-17 | Resolved: 2018-06-15

## 2018-06-15 PROCEDURE — 81002 URINALYSIS NONAUTO W/O SCOPE: CPT | Performed by: OBSTETRICS & GYNECOLOGY

## 2018-06-15 NOTE — PROGRESS NOTES
[facility]  Obstetrics and Gynecology  Prenatal Visit  Christy Chu MD    [facility] is a 20year old.o.  19w4d weeks. Patient not feeling fetal movement yet in the pregnancy.   She denies any nausea or vomiting as well as a

## 2018-06-18 NOTE — PROGRESS NOTES
STANDARD OBSTETRIC ULTRASOUND REPORT   See imaging tab for complete consultation / ultrasound report      Fetal Heart Rate: Present 140 bpm  Fetal Presentation: cephalic  Amniotic Fluid Volume: normal  Cord: 3 vessel cord  Placental Location:anterior

## 2018-06-19 ENCOUNTER — HOSPITAL ENCOUNTER (OUTPATIENT)
Dept: PERINATAL CARE | Facility: HOSPITAL | Age: 23
Discharge: HOME | End: 2018-06-19
Attending: OBSTETRICS & GYNECOLOGY
Payer: MEDICAID

## 2018-06-19 DIAGNOSIS — Z36.3 ENCOUNTER FOR ANTENATAL SCREENING FOR MALFORMATION USING ULTRASOUND: ICD-10-CM

## 2018-06-19 DIAGNOSIS — Z36.3 ENCOUNTER FOR ANTENATAL SCREENING FOR MALFORMATION USING ULTRASOUND: Primary | ICD-10-CM

## 2018-06-19 PROCEDURE — 99212 OFFICE O/P EST SF 10 MIN: CPT | Performed by: OBSTETRICS & GYNECOLOGY

## 2018-06-19 PROCEDURE — 76805 OB US >/= 14 WKS SNGL FETUS: CPT | Performed by: OBSTETRICS & GYNECOLOGY

## 2018-06-28 ENCOUNTER — TELEPHONE (OUTPATIENT)
Dept: OBGYN CLINIC | Facility: CLINIC | Age: 23
End: 2018-06-28

## 2018-06-28 NOTE — TELEPHONE ENCOUNTER
Pt voices she noticed cold symptoms coming on yesterday. Today pt has a sore throat, dry, non-productive cough, and mild frontal headache. Pt afebrile. Advised pt she needs to rest, push fluids.  She can take Tylenol, Tylenol cold, Robitussin, and cough lauren

## 2018-08-27 ENCOUNTER — TELEPHONE (OUTPATIENT)
Dept: OBGYN CLINIC | Facility: CLINIC | Age: 23
End: 2018-08-27

## 2018-08-29 NOTE — TELEPHONE ENCOUNTER
Due to pt's insurance, pt has transferred her care to Dr. Hany Scott from [facility]. Pt requesting records, and will sign the sarita form at the office and then pick records up. Pt's records placed at .

## 2018-09-29 NOTE — TELEPHONE ENCOUNTER
Pt. Calling to f/up on p/up her records at [facility], but records are not there. Please call pt. To confirm that records are at [facility] .  KEITH MIRANDA

## 2018-10-27 ENCOUNTER — HOSPITAL (OUTPATIENT)
Dept: OTHER | Age: 23
End: 2018-10-27
Attending: OBSTETRICS & GYNECOLOGY

## 2018-10-27 LAB
ANALYZER ANC (IANC): NORMAL
ERYTHROCYTE [DISTWIDTH] IN BLOOD: 14.4 % (ref 11–15)
HBV SURFACE AG SER QL: NEGATIVE
HEMATOCRIT: 38.7 % (ref 36–46.5)
HGB BLD-MCNC: 12.6 GM/DL (ref 12–15.5)
HIV1 AB SERPL QL IA: NONREACTIVE
MCH RBC QN AUTO: 28.6 PG (ref 26–34)
MCHC RBC AUTO-ENTMCNC: 32.6 GM/DL (ref 32–36.5)
MCV RBC AUTO: 88 FL (ref 78–100)
NRBC (NRBCRE): NORMAL
PLATELET # BLD: 176 THOUSAND/MCL (ref 140–450)
RBC # BLD: 4.4 MILLION/MCL (ref 4–5.2)
RUBV IGG SERPL IA-ACNC: 108.4 UNIT/ML
WBC # BLD: 8.9 THOUSAND/MCL (ref 4.2–11)

## 2018-10-29 LAB
ANALYZER ANC (IANC): ABNORMAL
ERYTHROCYTE [DISTWIDTH] IN BLOOD: 14.8 % (ref 11–15)
HEMATOCRIT: 31 % (ref 36–46.5)
HGB BLD-MCNC: 10.1 GM/DL (ref 12–15.5)
MCH RBC QN AUTO: 28.7 PG (ref 26–34)
MCHC RBC AUTO-ENTMCNC: 32.6 GM/DL (ref 32–36.5)
MCV RBC AUTO: 88.1 FL (ref 78–100)
NRBC (NRBCRE): ABNORMAL
PLATELET # BLD: 173 THOUSAND/MCL (ref 140–450)
RBC # BLD: 3.52 MILLION/MCL (ref 4–5.2)
WBC # BLD: 11.5 THOUSAND/MCL (ref 4.2–11)

## 2019-05-07 ENCOUNTER — OFFICE VISIT (OUTPATIENT)
Dept: OBGYN CLINIC | Facility: CLINIC | Age: 24
End: 2019-05-07
Payer: MEDICAID

## 2019-05-07 ENCOUNTER — APPOINTMENT (OUTPATIENT)
Dept: LAB | Facility: HOSPITAL | Age: 24
End: 2019-05-07
Attending: OBSTETRICS & GYNECOLOGY
Payer: MEDICAID

## 2019-05-07 VITALS
SYSTOLIC BLOOD PRESSURE: 114 MMHG | BODY MASS INDEX: 29.63 KG/M2 | DIASTOLIC BLOOD PRESSURE: 68 MMHG | WEIGHT: 161 LBS | HEIGHT: 62 IN

## 2019-05-07 DIAGNOSIS — R35.0 FREQUENT URINATION: ICD-10-CM

## 2019-05-07 DIAGNOSIS — Z87.59 HISTORY OF MISCARRIAGE: ICD-10-CM

## 2019-05-07 DIAGNOSIS — N92.6 MISSED MENSES: ICD-10-CM

## 2019-05-07 DIAGNOSIS — N92.6 MISSED MENSES: Primary | ICD-10-CM

## 2019-05-07 PROCEDURE — 36415 COLL VENOUS BLD VENIPUNCTURE: CPT

## 2019-05-07 PROCEDURE — 84144 ASSAY OF PROGESTERONE: CPT

## 2019-05-07 PROCEDURE — 99213 OFFICE O/P EST LOW 20 MIN: CPT | Performed by: OBSTETRICS & GYNECOLOGY

## 2019-05-07 PROCEDURE — 81001 URINALYSIS AUTO W/SCOPE: CPT

## 2019-05-07 PROCEDURE — 87086 URINE CULTURE/COLONY COUNT: CPT

## 2019-05-07 PROCEDURE — 81025 URINE PREGNANCY TEST: CPT | Performed by: OBSTETRICS & GYNECOLOGY

## 2019-05-07 PROCEDURE — 84702 CHORIONIC GONADOTROPIN TEST: CPT

## 2019-05-07 NOTE — PROGRESS NOTES
HPI:    Patient ID: Ramy Solorio is a 24year old female. HPI  Missed menses visit  26-year-old  3 para 1-1-0-1 with a last menstrual period of 2019 at 9-4/7 weeks gestational age with an EDC of 12 weeks 23.   She has a history o

## 2019-05-09 ENCOUNTER — TELEPHONE (OUTPATIENT)
Dept: OBGYN CLINIC | Facility: CLINIC | Age: 24
End: 2019-05-09

## 2019-05-09 DIAGNOSIS — Z34.90 NORMAL INTRAUTERINE PREGNANCY, ANTEPARTUM: Primary | ICD-10-CM

## 2019-05-09 NOTE — TELEPHONE ENCOUNTER
Wanda Carballo MD  [facility] Clinical Staff             Please inform that blood work looks good including a healthy quant bhcg AND progesterone.  Urinalysis was abnormal but culture was negative for a UTI.  I suspect it likely vaginal contamina

## 2019-05-09 NOTE — TELEPHONE ENCOUNTER
05/09/2019 Pt was informed about her recent blood work & U/A. Also that needs to repeat U/A clean catch. On her next visit with the RN. Pt verbalized understanding, pt did not have any further questions. U/A order has been placed in [facility].

## 2019-05-15 ENCOUNTER — OFFICE VISIT (OUTPATIENT)
Dept: OBGYN CLINIC | Facility: CLINIC | Age: 24
End: 2019-05-15
Payer: MEDICAID

## 2019-05-15 ENCOUNTER — TELEPHONE (OUTPATIENT)
Dept: OBGYN CLINIC | Facility: CLINIC | Age: 24
End: 2019-05-15

## 2019-05-15 ENCOUNTER — LAB ENCOUNTER (OUTPATIENT)
Dept: LAB | Facility: HOSPITAL | Age: 24
End: 2019-05-15
Attending: OBSTETRICS & GYNECOLOGY
Payer: MEDICAID

## 2019-05-15 ENCOUNTER — NURSE ONLY (OUTPATIENT)
Dept: OBGYN CLINIC | Facility: CLINIC | Age: 24
End: 2019-05-15
Payer: MEDICAID

## 2019-05-15 VITALS — SYSTOLIC BLOOD PRESSURE: 100 MMHG | DIASTOLIC BLOOD PRESSURE: 60 MMHG

## 2019-05-15 DIAGNOSIS — Z34.81 ENCOUNTER FOR SUPERVISION OF OTHER NORMAL PREGNANCY IN FIRST TRIMESTER: ICD-10-CM

## 2019-05-15 DIAGNOSIS — Z34.90 NORMAL INTRAUTERINE PREGNANCY, ANTEPARTUM: ICD-10-CM

## 2019-05-15 DIAGNOSIS — N92.6 MISSED MENSES: Primary | ICD-10-CM

## 2019-05-15 DIAGNOSIS — Z34.81 ENCOUNTER FOR SUPERVISION OF OTHER NORMAL PREGNANCY IN FIRST TRIMESTER: Primary | ICD-10-CM

## 2019-05-15 PROBLEM — N30.90 CYSTITIS: Status: RESOLVED | Noted: 2017-01-21 | Resolved: 2019-05-15

## 2019-05-15 PROCEDURE — 86780 TREPONEMA PALLIDUM: CPT

## 2019-05-15 PROCEDURE — 99212 OFFICE O/P EST SF 10 MIN: CPT | Performed by: OBSTETRICS & GYNECOLOGY

## 2019-05-15 PROCEDURE — 76817 TRANSVAGINAL US OBSTETRIC: CPT | Performed by: OBSTETRICS & GYNECOLOGY

## 2019-05-15 PROCEDURE — 86850 RBC ANTIBODY SCREEN: CPT

## 2019-05-15 PROCEDURE — 36415 COLL VENOUS BLD VENIPUNCTURE: CPT

## 2019-05-15 PROCEDURE — 81001 URINALYSIS AUTO W/SCOPE: CPT

## 2019-05-15 PROCEDURE — 86803 HEPATITIS C AB TEST: CPT

## 2019-05-15 PROCEDURE — 86900 BLOOD TYPING SEROLOGIC ABO: CPT

## 2019-05-15 PROCEDURE — 86901 BLOOD TYPING SEROLOGIC RH(D): CPT

## 2019-05-15 PROCEDURE — 87340 HEPATITIS B SURFACE AG IA: CPT

## 2019-05-15 PROCEDURE — 86762 RUBELLA ANTIBODY: CPT

## 2019-05-15 PROCEDURE — 87086 URINE CULTURE/COLONY COUNT: CPT

## 2019-05-15 PROCEDURE — 85025 COMPLETE CBC W/AUTO DIFF WBC: CPT

## 2019-05-15 PROCEDURE — 87389 HIV-1 AG W/HIV-1&-2 AB AG IA: CPT

## 2019-05-15 NOTE — PROGRESS NOTES
Patient here for nurse education. Pt currently 10w5d based LMP dating. . Hx of spontaneous Ab x1. Reviewed all education information including, but not limited to nutrition, medication guidance, travel, restrictions, precautions.   Prenatal orders p

## 2019-05-21 ENCOUNTER — TELEPHONE (OUTPATIENT)
Dept: PERINATAL CARE | Facility: HOSPITAL | Age: 24
End: 2019-05-21

## 2019-05-21 ENCOUNTER — TELEPHONE (OUTPATIENT)
Dept: OBGYN CLINIC | Facility: CLINIC | Age: 24
End: 2019-05-21

## 2019-05-21 DIAGNOSIS — O23.41 URINARY TRACT INFECTION IN MOTHER DURING FIRST TRIMESTER OF PREGNANCY: Primary | ICD-10-CM

## 2019-05-21 NOTE — TELEPHONE ENCOUNTER
French phone line  #[phone] used to translate phone call. Pt informed of message below. ----- Message from Claude Dexter, MD sent at 5/17/2019 10:45 AM CDT -----  All PN labs normal but for UA. No uti per negative C and S.   Repeat clean ca

## 2019-05-21 NOTE — TELEPHONE ENCOUNTER
Note    Pt procedure: Ultrasound   Pt insurance/number to contact: [employer] ID# and group: [id number]  Procedure scheduled inpt/outpt:  oupt   Procedure scheduled where: [facility]   Pt.  GA & SHAHEED: 12w0d SHAHEED: 12/0

## 2019-05-22 ENCOUNTER — TELEPHONE (OUTPATIENT)
Dept: OBGYN CLINIC | Facility: CLINIC | Age: 24
End: 2019-05-22

## 2019-05-22 ENCOUNTER — APPOINTMENT (OUTPATIENT)
Dept: LAB | Facility: HOSPITAL | Age: 24
End: 2019-05-22
Attending: OBSTETRICS & GYNECOLOGY
Payer: MEDICAID

## 2019-05-22 DIAGNOSIS — N39.0 URINARY TRACT INFECTION WITHOUT HEMATURIA, SITE UNSPECIFIED: Primary | ICD-10-CM

## 2019-05-22 DIAGNOSIS — O23.41 URINARY TRACT INFECTION IN MOTHER DURING FIRST TRIMESTER OF PREGNANCY: ICD-10-CM

## 2019-05-22 DIAGNOSIS — N39.0 URINARY TRACT INFECTION WITHOUT HEMATURIA, SITE UNSPECIFIED: ICD-10-CM

## 2019-05-22 PROCEDURE — 87086 URINE CULTURE/COLONY COUNT: CPT

## 2019-05-22 PROCEDURE — 81001 URINALYSIS AUTO W/SCOPE: CPT

## 2019-05-22 NOTE — TELEPHONE ENCOUNTER
Informed pt of message below---     Message from [facility] MD DAV sent at 5/22/2019  5:02 PM CDT -----  Please inform that urinalysis shows a possible urinary tract infection versus a contamination.   If the patient is symptomatic, we can prescribe an an

## 2019-05-24 NOTE — TELEPHONE ENCOUNTER
Pt informed regarding FTS and Cell Free DNA. States she would still like to do the FTS with MFM, but does want to know the sex of baby. Informed her on Progenity/Panorama, pt would like to place a call to panorama and see if her insurance will cover it.  Pr

## 2019-05-24 NOTE — TELEPHONE ENCOUNTER
Received call from Liu Elliott in [place] [address] who scheduled pt for FTS but when scheduling, pt requested cell free DNA testing to obtain baby's gender. Liu Elliott counseled pt on possibility of out of pocket costs as test will likely not be covered by insurance.   Per Liu Elliott,

## 2019-05-28 ENCOUNTER — HOSPITAL ENCOUNTER (OUTPATIENT)
Dept: PERINATAL CARE | Facility: HOSPITAL | Age: 24
Discharge: HOME | End: 2019-05-28
Attending: OBSTETRICS & GYNECOLOGY
Payer: MEDICAID

## 2019-05-28 ENCOUNTER — TELEPHONE (OUTPATIENT)
Dept: OBGYN CLINIC | Facility: CLINIC | Age: 24
End: 2019-05-28

## 2019-05-28 VITALS
DIASTOLIC BLOOD PRESSURE: 52 MMHG | SYSTOLIC BLOOD PRESSURE: 116 MMHG | BODY MASS INDEX: 29 KG/M2 | WEIGHT: 161 LBS | HEART RATE: 78 BPM

## 2019-05-28 DIAGNOSIS — Z36.9 FIRST TRIMESTER SCREENING: Primary | ICD-10-CM

## 2019-05-28 DIAGNOSIS — Z36.9 FIRST TRIMESTER SCREENING: ICD-10-CM

## 2019-05-28 PROBLEM — Z33.1 IUP (INTRAUTERINE PREGNANCY), INCIDENTAL (HCC): Status: ACTIVE | Noted: 2019-05-28

## 2019-05-28 PROBLEM — Z33.1 IUP (INTRAUTERINE PREGNANCY), INCIDENTAL: Status: ACTIVE | Noted: 2019-05-28

## 2019-05-28 PROCEDURE — 76813 OB US NUCHAL MEAS 1 GEST: CPT | Performed by: OBSTETRICS & GYNECOLOGY

## 2019-05-28 PROCEDURE — 99211 OFF/OP EST MAY X REQ PHY/QHP: CPT | Performed by: OBSTETRICS & GYNECOLOGY

## 2019-05-28 NOTE — PROGRESS NOTES
Outpatient Maternal-Fetal Medicine First Trimester Screen    Thank you for requesting an ultrasound on your patient Edilma.   As you are aware she is a 24year old year old female  with a [place] pregnancy an Estimated Date of  Soy

## 2019-05-28 NOTE — TELEPHONE ENCOUNTER
Authorization Number: NA  Case Number: [id number]  Status: Pending eviCore Review  Approval Date:   Service Code: [id number]  Service Description: Ultrasound, pregnant uterus  Site Name: [address] Date:   Date Last Updated: 5

## 2019-05-28 NOTE — TELEPHONE ENCOUNTER
Pt calling in regards to CPT code for panorama. Pt informed and given CPT. Verbalized understanding.

## 2019-05-28 NOTE — TELEPHONE ENCOUNTER
Patient was seen a [facility] today, 05/28, for OBUS (cpt 37808). Her [employer] would like a pre authorization from [place]. Please call [phone] to obtain auth. Thank you very much.

## 2019-06-06 ENCOUNTER — APPOINTMENT (OUTPATIENT)
Dept: LAB | Facility: HOSPITAL | Age: 24
End: 2019-06-06
Attending: OBSTETRICS & GYNECOLOGY
Payer: MEDICAID

## 2019-06-06 ENCOUNTER — TELEPHONE (OUTPATIENT)
Dept: OBGYN CLINIC | Facility: CLINIC | Age: 24
End: 2019-06-06

## 2019-06-06 DIAGNOSIS — R35.0 URINARY FREQUENCY: Primary | ICD-10-CM

## 2019-06-06 DIAGNOSIS — Z34.82 ENCOUNTER FOR SUPERVISION OF OTHER NORMAL PREGNANCY IN SECOND TRIMESTER: ICD-10-CM

## 2019-06-06 DIAGNOSIS — R35.0 URINARY FREQUENCY: ICD-10-CM

## 2019-06-06 PROCEDURE — 87086 URINE CULTURE/COLONY COUNT: CPT

## 2019-06-06 PROCEDURE — 81001 URINALYSIS AUTO W/SCOPE: CPT

## 2019-06-06 RX ORDER — NITROFURANTOIN 25; 75 MG/1; MG/1
100 CAPSULE ORAL 2 TIMES DAILY
Qty: 10 CAPSULE | Refills: 0 | Status: SHIPPED | OUTPATIENT
Start: 2019-06-06 | End: 2019-06-11

## 2019-06-06 NOTE — TELEPHONE ENCOUNTER
Pt 13 weeks 6 days c/o constant pelvic pains since last night. Per pt thought pain was related to her constipation, but had BM this am and still no relief.  Rated 7/70  Has hx of recurrent UTIs unsure if that could be reason  Reported pelvic pains with urg

## 2019-06-10 ENCOUNTER — OFFICE VISIT (OUTPATIENT)
Dept: OBGYN CLINIC | Facility: CLINIC | Age: 24
End: 2019-06-10
Payer: MEDICAID

## 2019-06-10 ENCOUNTER — TELEPHONE (OUTPATIENT)
Dept: OBGYN CLINIC | Facility: CLINIC | Age: 24
End: 2019-06-10

## 2019-06-10 VITALS
SYSTOLIC BLOOD PRESSURE: 116 MMHG | HEIGHT: 61 IN | WEIGHT: 157 LBS | DIASTOLIC BLOOD PRESSURE: 62 MMHG | BODY MASS INDEX: 29.64 KG/M2

## 2019-06-10 DIAGNOSIS — R39.9 UTI SYMPTOMS: Primary | ICD-10-CM

## 2019-06-10 DIAGNOSIS — Z32.01 PREGNANCY TEST POSITIVE: ICD-10-CM

## 2019-06-10 PROCEDURE — 81002 URINALYSIS NONAUTO W/O SCOPE: CPT | Performed by: OBSTETRICS & GYNECOLOGY

## 2019-06-10 PROCEDURE — 99213 OFFICE O/P EST LOW 20 MIN: CPT | Performed by: OBSTETRICS & GYNECOLOGY

## 2019-06-10 RX ORDER — AMOXICILLIN 500 MG/1
500 CAPSULE ORAL 3 TIMES DAILY
Qty: 21 CAPSULE | Refills: 0 | Status: SHIPPED | OUTPATIENT
Start: 2019-06-10 | End: 2019-06-17

## 2019-06-10 NOTE — PROGRESS NOTES
HPI:   Briana Bruno is a 24year old female who presents for a hx of tx with macrobid 6/6 for possible uti. pt stated she has suprapubic discomfort and has urinary urgency and frequency for about 1 week. On finall day of macrobid.   Pt counseled, pt itching,periods regular   MUSCULOSKELETAL: denies back pain  NEURO: denies headaches  PSYCHE: denies depression or anxiety  HEMATOLOGIC: denies hx of anemia  ENDOCRINE: denies thyroid history  ALL/ASTHMA: denies hx of allergy or asthma    EXAM:   /62

## 2019-06-10 NOTE — TELEPHONE ENCOUNTER
Pt Name and  verified. Pt informed of provider rec and verbalized understanding. scheduled apt to be seen by Heather Jane today at 5. No further questions.

## 2019-06-10 NOTE — TELEPHONE ENCOUNTER
Pt Name and  verified. Pt is 14w3d, states she went to get U/A C&S. Pt states that she has taken Macrobid but has not noticed any changes in her symptoms. States that she still has pelvic pain.  pls advise

## 2019-06-10 NOTE — TELEPHONE ENCOUNTER
Recommend that patient be evaluated in office today if appointment available. If not should be seen in ER.

## 2019-06-11 ENCOUNTER — TELEPHONE (OUTPATIENT)
Dept: PERINATAL CARE | Facility: HOSPITAL | Age: 24
End: 2019-06-11

## 2019-06-11 NOTE — TELEPHONE ENCOUNTER
Pt 1st trimester screen results obt  Reviewed By MD Madison Vincentian  Low risk for trisomy 18/13/21  Less than 1 in 78112 risk for trisomy 18/13  Less than 1 in 2974 risk for trisomy 21      LVMW#TCB WITH QUESTIONS    Report sent for scanning into pt record

## 2019-06-12 ENCOUNTER — INITIAL PRENATAL (OUTPATIENT)
Dept: OBGYN CLINIC | Facility: CLINIC | Age: 24
End: 2019-06-12
Payer: MEDICAID

## 2019-06-12 VITALS
HEART RATE: 80 BPM | SYSTOLIC BLOOD PRESSURE: 116 MMHG | BODY MASS INDEX: 29 KG/M2 | DIASTOLIC BLOOD PRESSURE: 73 MMHG | WEIGHT: 156 LBS

## 2019-06-12 DIAGNOSIS — Z34.82 ENCOUNTER FOR SUPERVISION OF OTHER NORMAL PREGNANCY IN SECOND TRIMESTER: Primary | ICD-10-CM

## 2019-06-12 PROBLEM — Z34.90 SUPERVISION OF NORMAL PREGNANCY (HCC): Status: ACTIVE | Noted: 2019-06-12

## 2019-06-12 PROBLEM — Z34.90 SUPERVISION OF NORMAL PREGNANCY: Status: ACTIVE | Noted: 2019-06-12

## 2019-06-12 PROBLEM — N92.6 MISSED MENSES: Status: RESOLVED | Noted: 2018-03-16 | Resolved: 2019-06-12

## 2019-06-12 PROCEDURE — 81002 URINALYSIS NONAUTO W/O SCOPE: CPT | Performed by: OBSTETRICS & GYNECOLOGY

## 2019-06-12 PROCEDURE — 0500F INITIAL PRENATAL CARE VISIT: CPT | Performed by: OBSTETRICS & GYNECOLOGY

## 2019-06-14 ENCOUNTER — TELEPHONE (OUTPATIENT)
Dept: OBGYN CLINIC | Facility: CLINIC | Age: 24
End: 2019-06-14

## 2019-06-14 NOTE — TELEPHONE ENCOUNTER
Patient has an appointment at [facility] on 06/18 for Louise Tarango (cpt [id number]). Her [employer] would like a pre authorization from [place]. Please call [phone] to obtain auth. Thank you very much.

## 2019-06-18 ENCOUNTER — HOSPITAL ENCOUNTER (OUTPATIENT)
Dept: ULTRASOUND IMAGING | Facility: HOSPITAL | Age: 24
Discharge: HOME | End: 2019-06-18
Attending: OBSTETRICS & GYNECOLOGY
Payer: MEDICAID

## 2019-06-18 ENCOUNTER — TELEPHONE (OUTPATIENT)
Dept: OBGYN CLINIC | Facility: CLINIC | Age: 24
End: 2019-06-18

## 2019-06-18 DIAGNOSIS — R10.9 ABDOMINAL PAIN IN PREGNANCY, SECOND TRIMESTER: Primary | ICD-10-CM

## 2019-06-18 DIAGNOSIS — Z32.01 PREGNANCY TEST POSITIVE: ICD-10-CM

## 2019-06-18 DIAGNOSIS — O26.892 ABDOMINAL PAIN IN PREGNANCY, SECOND TRIMESTER: Primary | ICD-10-CM

## 2019-06-18 NOTE — TELEPHONE ENCOUNTER
RN spoke with phone room rep and advised that patient should reschedule as this is for her routine anatomy scan. Phone room to advise patient.

## 2019-06-18 NOTE — TELEPHONE ENCOUNTER
U/S dept stated pt is in for service of 2nd trimester u/s but since pt is only 15 weeks she needs to change code of u/s being done. Would like to know if she can have that order changed or should pt wait.

## 2019-06-18 NOTE — TELEPHONE ENCOUNTER
PA authorized.  ==============================================    Primary Diagnosis Code: Z34.82 Description: Encounter for supervision of other normal pregnancy, second trimester  Secondary Diagnosis Code:  Description:   Date of Service: Not provided

## 2019-06-18 NOTE — IMAGING NOTE
Per Ashley Guzman phone rep, spoke with RN and asked patient to reschedule for her 20 week scan. Patient verbalized understanding and will reschedule for 20 weeks.

## 2019-06-21 ENCOUNTER — HOSPITAL ENCOUNTER (OUTPATIENT)
Dept: ULTRASOUND IMAGING | Facility: HOSPITAL | Age: 24
Discharge: HOME | End: 2019-06-21
Attending: OBSTETRICS & GYNECOLOGY
Payer: MEDICAID

## 2019-06-21 DIAGNOSIS — O26.892 ABDOMINAL PAIN IN PREGNANCY, SECOND TRIMESTER: ICD-10-CM

## 2019-06-21 DIAGNOSIS — R10.9 ABDOMINAL PAIN IN PREGNANCY, SECOND TRIMESTER: ICD-10-CM

## 2019-06-21 PROCEDURE — 76815 OB US LIMITED FETUS(S): CPT | Performed by: OBSTETRICS & GYNECOLOGY

## 2019-06-21 NOTE — TELEPHONE ENCOUNTER
Pt Name and  verified. Pt states she was given an order for US to be done due to having a lot of abd discomfort. Was advised by radiology that she was too early for the ultrasound and order would need to be replaced.  pls advise

## 2019-06-21 NOTE — TELEPHONE ENCOUNTER
Pt offered and apt today in office with a provider. States that she cannot make it in at the times that were offered. Advised that a hold and call may be needed and states that she would need to be informed what time this will need to be done.

## 2019-06-21 NOTE — TELEPHONE ENCOUNTER
Noted, spoke with della,  She will arrange for a provider to see pt today and for a stat u/s at radiology for a limited ob 2nd trimester ultrasound.

## 2019-06-21 NOTE — TELEPHONE ENCOUNTER
Pt informed that stat US was ordered and she is to arrive at 4:30. Unfortunately she was not able to come in for an in office appt but was able to go to Stat US.  Routing to on call provider as Hailey Browinng

## 2019-06-22 NOTE — TELEPHONE ENCOUNTER
Andorran speaker- was told she would get a call back from RN in regards to next steps after having u/s done yesterday.

## 2019-06-24 ENCOUNTER — TELEPHONE (OUTPATIENT)
Dept: OBGYN CLINIC | Facility: CLINIC | Age: 24
End: 2019-06-24

## 2019-06-24 NOTE — TELEPHONE ENCOUNTER
Pt was seen at [facility] on 06/21 for OBUS (cpt [id number]). Her [employer] would like an authorization. Please call Dada at [phone] to obtain auth. Thank you very much.

## 2019-06-25 NOTE — TELEPHONE ENCOUNTER
CPT Code: 50286 Description: Ob us, limited, fetus(s)  Authorization Number: [id number]  Review Date: 6/25/2019 1:31:45 PM  Expiration Date: 8/08/5331  Status: Certify, Abdominal or Pelvic pain.

## 2019-07-05 ENCOUNTER — APPOINTMENT (OUTPATIENT)
Dept: LAB | Facility: HOSPITAL | Age: 24
End: 2019-07-05
Attending: OBSTETRICS & GYNECOLOGY
Payer: MEDICAID

## 2019-07-05 DIAGNOSIS — Z34.82 ENCOUNTER FOR SUPERVISION OF OTHER NORMAL PREGNANCY IN SECOND TRIMESTER: ICD-10-CM

## 2019-07-05 PROCEDURE — 82105 ALPHA-FETOPROTEIN SERUM: CPT

## 2019-07-05 PROCEDURE — 36415 COLL VENOUS BLD VENIPUNCTURE: CPT

## 2019-07-08 LAB
AFP SMOKING: NO
FAMILY HX NEURAL TUBE DEFECT: NO
INSULIN REQ MATERNAL DIABETES: NO
MATERNAL AGE OF DELIVERY: 25.3 YR
MOM FOR AFP: 0.87
PATIENT'S AFP: 35 NG/ML

## 2019-07-09 ENCOUNTER — ROUTINE PRENATAL (OUTPATIENT)
Dept: OBGYN CLINIC | Facility: CLINIC | Age: 24
End: 2019-07-09
Payer: MEDICAID

## 2019-07-09 ENCOUNTER — TELEPHONE (OUTPATIENT)
Dept: OBGYN CLINIC | Facility: CLINIC | Age: 24
End: 2019-07-09

## 2019-07-09 VITALS — WEIGHT: 158 LBS | BODY MASS INDEX: 30 KG/M2 | DIASTOLIC BLOOD PRESSURE: 62 MMHG | SYSTOLIC BLOOD PRESSURE: 104 MMHG

## 2019-07-09 DIAGNOSIS — Z34.92 NORMAL PREGNANCY IN SECOND TRIMESTER: Primary | ICD-10-CM

## 2019-07-09 LAB
APPEARANCE: CLEAR
MULTISTIX LOT#: NORMAL NUMERIC
PH, URINE: 7.5 (ref 4.5–8)
SPECIFIC GRAVITY: 1.02 (ref 1–1.03)
URINE-COLOR: YELLOW
UROBILINOGEN,SEMI-QN: 0.2 MG/DL (ref 0–1.9)

## 2019-07-09 PROCEDURE — 81002 URINALYSIS NONAUTO W/O SCOPE: CPT | Performed by: OBSTETRICS & GYNECOLOGY

## 2019-07-09 PROCEDURE — 0502F SUBSEQUENT PRENATAL CARE: CPT | Performed by: OBSTETRICS & GYNECOLOGY

## 2019-07-09 NOTE — TELEPHONE ENCOUNTER
----- Message from Alisha Dodson MD sent at 7/9/2019  4:51 PM CDT -----  Please inform by MyChart, call, or mail of normal genetics screen (Quad, AFP Tetra, Panorama, other)-- MSAFP.

## 2019-07-19 ENCOUNTER — HOSPITAL ENCOUNTER (OUTPATIENT)
Dept: ULTRASOUND IMAGING | Facility: HOSPITAL | Age: 24
Discharge: HOME | End: 2019-07-19
Attending: OBSTETRICS & GYNECOLOGY
Payer: MEDICAID

## 2019-07-19 DIAGNOSIS — Z34.92 NORMAL PREGNANCY IN SECOND TRIMESTER: ICD-10-CM

## 2019-07-19 PROCEDURE — 76805 OB US >/= 14 WKS SNGL FETUS: CPT | Performed by: OBSTETRICS & GYNECOLOGY

## 2019-08-06 ENCOUNTER — ROUTINE PRENATAL (OUTPATIENT)
Dept: OBGYN CLINIC | Facility: CLINIC | Age: 24
End: 2019-08-06
Payer: MEDICAID

## 2019-08-06 VITALS — SYSTOLIC BLOOD PRESSURE: 102 MMHG | BODY MASS INDEX: 31 KG/M2 | WEIGHT: 163 LBS | DIASTOLIC BLOOD PRESSURE: 64 MMHG

## 2019-08-06 DIAGNOSIS — Z34.92 NORMAL PREGNANCY IN SECOND TRIMESTER: Primary | ICD-10-CM

## 2019-08-06 LAB
APPEARANCE: CLEAR
MULTISTIX LOT#: NORMAL NUMERIC
PH, URINE: 6 (ref 4.5–8)
SPECIFIC GRAVITY: 1.02 (ref 1–1.03)
URINE-COLOR: YELLOW
UROBILINOGEN,SEMI-QN: 0.2 MG/DL (ref 0–1.9)

## 2019-08-06 PROCEDURE — 81002 URINALYSIS NONAUTO W/O SCOPE: CPT | Performed by: OBSTETRICS & GYNECOLOGY

## 2019-08-06 PROCEDURE — 0502F SUBSEQUENT PRENATAL CARE: CPT | Performed by: OBSTETRICS & GYNECOLOGY

## 2019-09-03 ENCOUNTER — ROUTINE PRENATAL (OUTPATIENT)
Dept: OBGYN CLINIC | Facility: CLINIC | Age: 24
End: 2019-09-03
Payer: MEDICAID

## 2019-09-03 VITALS
HEART RATE: 72 BPM | SYSTOLIC BLOOD PRESSURE: 119 MMHG | WEIGHT: 169 LBS | BODY MASS INDEX: 32 KG/M2 | DIASTOLIC BLOOD PRESSURE: 61 MMHG

## 2019-09-03 DIAGNOSIS — Z34.82 ENCOUNTER FOR SUPERVISION OF OTHER NORMAL PREGNANCY IN SECOND TRIMESTER: Primary | ICD-10-CM

## 2019-09-03 LAB
MULTISTIX LOT#: NORMAL NUMERIC
PH, URINE: 7 (ref 4.5–8)
SPECIFIC GRAVITY: 1.02 (ref 1–1.03)
URINE-COLOR: YELLOW
UROBILINOGEN,SEMI-QN: 1 MG/DL (ref 0–1.9)

## 2019-09-03 PROCEDURE — 81002 URINALYSIS NONAUTO W/O SCOPE: CPT | Performed by: OBSTETRICS & GYNECOLOGY

## 2019-09-03 PROCEDURE — 0502F SUBSEQUENT PRENATAL CARE: CPT | Performed by: OBSTETRICS & GYNECOLOGY

## 2019-09-03 NOTE — PROGRESS NOTES
[facility]  Obstetrics and Gynecology  Prenatal Visit  José Luis Iglesias MD    HPI   Grisel Gardenia Candelario is a 24year old.o.  26w4d weeks. Here for routine prenatal visit and is without complaints.   Patient denies any regular uterine contracti

## 2019-09-12 ENCOUNTER — LAB ENCOUNTER (OUTPATIENT)
Dept: LAB | Facility: HOSPITAL | Age: 24
End: 2019-09-12
Attending: OBSTETRICS & GYNECOLOGY
Payer: MEDICAID

## 2019-09-12 DIAGNOSIS — Z34.82 ENCOUNTER FOR SUPERVISION OF OTHER NORMAL PREGNANCY IN SECOND TRIMESTER: ICD-10-CM

## 2019-09-12 LAB
BASOPHILS # BLD AUTO: 0.02 X10(3) UL (ref 0–0.2)
BASOPHILS NFR BLD AUTO: 0.2 %
DEPRECATED RDW RBC AUTO: 44.5 FL (ref 35.1–46.3)
EOSINOPHIL # BLD AUTO: 0.22 X10(3) UL (ref 0–0.7)
EOSINOPHIL NFR BLD AUTO: 2.3 %
ERYTHROCYTE [DISTWIDTH] IN BLOOD BY AUTOMATED COUNT: 13.3 % (ref 11–15)
GLUCOSE 1H P GLC SERPL-MCNC: 106 MG/DL
HCT VFR BLD AUTO: 37.2 % (ref 35–48)
HGB BLD-MCNC: 12.3 G/DL (ref 12–16)
IMM GRANULOCYTES # BLD AUTO: 0.12 X10(3) UL (ref 0–1)
IMM GRANULOCYTES NFR BLD: 1.2 %
LYMPHOCYTES # BLD AUTO: 1.43 X10(3) UL (ref 1–4)
LYMPHOCYTES NFR BLD AUTO: 14.7 %
MCH RBC QN AUTO: 30.3 PG (ref 26–34)
MCHC RBC AUTO-ENTMCNC: 33.1 G/DL (ref 31–37)
MCV RBC AUTO: 91.6 FL (ref 80–100)
MONOCYTES # BLD AUTO: 0.44 X10(3) UL (ref 0.1–1)
MONOCYTES NFR BLD AUTO: 4.5 %
NEUTROPHILS # BLD AUTO: 7.51 X10 (3) UL (ref 1.5–7.7)
NEUTROPHILS # BLD AUTO: 7.51 X10(3) UL (ref 1.5–7.7)
NEUTROPHILS NFR BLD AUTO: 77.1 %
PLATELET # BLD AUTO: 154 10(3)UL (ref 150–450)
RBC # BLD AUTO: 4.06 X10(6)UL (ref 3.8–5.3)
WBC # BLD AUTO: 9.7 X10(3) UL (ref 4–11)

## 2019-09-12 PROCEDURE — 82950 GLUCOSE TEST: CPT

## 2019-09-12 PROCEDURE — 36415 COLL VENOUS BLD VENIPUNCTURE: CPT

## 2019-09-12 PROCEDURE — 85025 COMPLETE CBC W/AUTO DIFF WBC: CPT

## 2019-09-16 ENCOUNTER — ROUTINE PRENATAL (OUTPATIENT)
Dept: OBGYN CLINIC | Facility: CLINIC | Age: 24
End: 2019-09-16
Payer: MEDICAID

## 2019-09-16 ENCOUNTER — APPOINTMENT (OUTPATIENT)
Dept: LAB | Facility: HOSPITAL | Age: 24
End: 2019-09-16
Attending: ADVANCED PRACTICE MIDWIFE
Payer: MEDICAID

## 2019-09-16 VITALS
HEART RATE: 83 BPM | BODY MASS INDEX: 32 KG/M2 | WEIGHT: 169 LBS | SYSTOLIC BLOOD PRESSURE: 108 MMHG | DIASTOLIC BLOOD PRESSURE: 68 MMHG

## 2019-09-16 DIAGNOSIS — Z34.83 ENCOUNTER FOR SUPERVISION OF OTHER NORMAL PREGNANCY IN THIRD TRIMESTER: Primary | ICD-10-CM

## 2019-09-16 DIAGNOSIS — R31.0 GROSS HEMATURIA: ICD-10-CM

## 2019-09-16 DIAGNOSIS — Z23 NEED FOR VACCINATION: ICD-10-CM

## 2019-09-16 LAB
APPEARANCE: CLEAR
MULTISTIX LOT#: NORMAL NUMERIC
PH, URINE: 7 (ref 4.5–8)
SPECIFIC GRAVITY: 1.01 (ref 1–1.03)
URINE-COLOR: YELLOW
UROBILINOGEN,SEMI-QN: 0.2 MG/DL (ref 0–1.9)

## 2019-09-16 PROCEDURE — 90471 IMMUNIZATION ADMIN: CPT | Performed by: ADVANCED PRACTICE MIDWIFE

## 2019-09-16 PROCEDURE — 90715 TDAP VACCINE 7 YRS/> IM: CPT | Performed by: ADVANCED PRACTICE MIDWIFE

## 2019-09-16 PROCEDURE — 0502F SUBSEQUENT PRENATAL CARE: CPT | Performed by: ADVANCED PRACTICE MIDWIFE

## 2019-09-16 PROCEDURE — 81002 URINALYSIS NONAUTO W/O SCOPE: CPT | Performed by: ADVANCED PRACTICE MIDWIFE

## 2019-09-16 PROCEDURE — 87086 URINE CULTURE/COLONY COUNT: CPT

## 2019-09-16 RX ORDER — BREAST PUMP
EACH MISCELLANEOUS
Qty: 1 EACH | Refills: 0 | Status: SHIPPED | OUTPATIENT
Start: 2019-09-16 | End: 2020-01-21

## 2019-09-16 NOTE — PROGRESS NOTES
S.  Denies GOMEZ, vision change, URQ pain, swelling. EDPS 0. Is a homemaker. Denies recent intercourse or vaginal discharge  O. See above  A.  [id number]  Short intraconceptual period  Hematuria  P.   Chart reviewed  EDD1/, Ges Age28 w 3 d, Proble

## 2019-09-18 ENCOUNTER — TELEPHONE (OUTPATIENT)
Dept: OBGYN CLINIC | Facility: CLINIC | Age: 24
End: 2019-09-18

## 2019-09-18 NOTE — TELEPHONE ENCOUNTER
Noted.    ----- Message from VLAD Ingram sent at 9/17/2019  3:25 PM CDT -----  Contaminated specimen, no need for treatment

## 2019-10-02 ENCOUNTER — ROUTINE PRENATAL (OUTPATIENT)
Dept: OBGYN CLINIC | Facility: CLINIC | Age: 24
End: 2019-10-02
Payer: MEDICAID

## 2019-10-02 ENCOUNTER — LAB ENCOUNTER (OUTPATIENT)
Dept: LAB | Facility: HOSPITAL | Age: 24
End: 2019-10-02
Attending: OBSTETRICS & GYNECOLOGY
Payer: MEDICAID

## 2019-10-02 VITALS — BODY MASS INDEX: 33 KG/M2 | DIASTOLIC BLOOD PRESSURE: 66 MMHG | WEIGHT: 172 LBS | SYSTOLIC BLOOD PRESSURE: 110 MMHG

## 2019-10-02 DIAGNOSIS — R31.9 HEMATURIA, UNSPECIFIED: Primary | ICD-10-CM

## 2019-10-02 DIAGNOSIS — R31.9 HEMATURIA, UNSPECIFIED TYPE: ICD-10-CM

## 2019-10-02 DIAGNOSIS — Z34.83 ENCOUNTER FOR SUPERVISION OF OTHER NORMAL PREGNANCY IN THIRD TRIMESTER: Primary | ICD-10-CM

## 2019-10-02 PROCEDURE — 87086 URINE CULTURE/COLONY COUNT: CPT

## 2019-10-02 PROCEDURE — 0502F SUBSEQUENT PRENATAL CARE: CPT | Performed by: OBSTETRICS & GYNECOLOGY

## 2019-10-02 PROCEDURE — 81002 URINALYSIS NONAUTO W/O SCOPE: CPT | Performed by: OBSTETRICS & GYNECOLOGY

## 2019-10-03 ENCOUNTER — TELEPHONE (OUTPATIENT)
Dept: OBGYN CLINIC | Facility: CLINIC | Age: 24
End: 2019-10-03

## 2019-10-03 RX ORDER — AMOXICILLIN 250 MG/1
250 CAPSULE ORAL 3 TIMES DAILY
Qty: 21 CAPSULE | Refills: 0 | Status: SHIPPED | OUTPATIENT
Start: 2019-10-03 | End: 2019-10-13

## 2019-10-03 NOTE — PROGRESS NOTES
Urinary frequency. Mildly abnl UA. Will Rx Amox. Send C and S. Last one contaminated. Good fm. Denies vaginal bleeding, discharge, or other.

## 2019-10-08 ENCOUNTER — TELEPHONE (OUTPATIENT)
Dept: OBGYN CLINIC | Facility: CLINIC | Age: 24
End: 2019-10-08

## 2019-10-08 NOTE — TELEPHONE ENCOUNTER
----- Message from Lori Brewer MD sent at 10/4/2019  9:53 AM CDT -----  Please inform neg urine culture indicating no UTI. Can stop and save antibiotic.

## 2019-10-16 ENCOUNTER — ROUTINE PRENATAL (OUTPATIENT)
Dept: OBGYN CLINIC | Facility: CLINIC | Age: 24
End: 2019-10-16
Payer: MEDICAID

## 2019-10-16 VITALS — BODY MASS INDEX: 33 KG/M2 | WEIGHT: 173 LBS | DIASTOLIC BLOOD PRESSURE: 62 MMHG | SYSTOLIC BLOOD PRESSURE: 104 MMHG

## 2019-10-16 DIAGNOSIS — Z34.83 ENCOUNTER FOR SUPERVISION OF OTHER NORMAL PREGNANCY IN THIRD TRIMESTER: Primary | ICD-10-CM

## 2019-10-16 PROCEDURE — 81002 URINALYSIS NONAUTO W/O SCOPE: CPT | Performed by: OBSTETRICS & GYNECOLOGY

## 2019-10-16 PROCEDURE — 0502F SUBSEQUENT PRENATAL CARE: CPT | Performed by: OBSTETRICS & GYNECOLOGY

## 2019-10-16 PROCEDURE — 90471 IMMUNIZATION ADMIN: CPT | Performed by: OBSTETRICS & GYNECOLOGY

## 2019-10-16 PROCEDURE — 90686 IIV4 VACC NO PRSV 0.5 ML IM: CPT | Performed by: OBSTETRICS & GYNECOLOGY

## 2019-10-31 ENCOUNTER — ROUTINE PRENATAL (OUTPATIENT)
Dept: OBGYN CLINIC | Facility: CLINIC | Age: 24
End: 2019-10-31
Payer: MEDICAID

## 2019-10-31 VITALS — WEIGHT: 176 LBS | BODY MASS INDEX: 33 KG/M2 | SYSTOLIC BLOOD PRESSURE: 122 MMHG | DIASTOLIC BLOOD PRESSURE: 70 MMHG

## 2019-10-31 DIAGNOSIS — Z34.93 NORMAL PREGNANCY IN THIRD TRIMESTER: Primary | ICD-10-CM

## 2019-10-31 PROCEDURE — 0502F SUBSEQUENT PRENATAL CARE: CPT | Performed by: OBSTETRICS & GYNECOLOGY

## 2019-10-31 PROCEDURE — 81002 URINALYSIS NONAUTO W/O SCOPE: CPT | Performed by: OBSTETRICS & GYNECOLOGY

## 2019-11-05 ENCOUNTER — ROUTINE PRENATAL (OUTPATIENT)
Dept: OBGYN CLINIC | Facility: CLINIC | Age: 24
End: 2019-11-05
Payer: MEDICAID

## 2019-11-05 ENCOUNTER — LAB ENCOUNTER (OUTPATIENT)
Dept: LAB | Facility: HOSPITAL | Age: 24
End: 2019-11-05
Attending: OBSTETRICS & GYNECOLOGY
Payer: MEDICAID

## 2019-11-05 VITALS — BODY MASS INDEX: 33 KG/M2 | WEIGHT: 176 LBS | SYSTOLIC BLOOD PRESSURE: 100 MMHG | DIASTOLIC BLOOD PRESSURE: 66 MMHG

## 2019-11-05 DIAGNOSIS — Z34.83 ENCOUNTER FOR SUPERVISION OF OTHER NORMAL PREGNANCY IN THIRD TRIMESTER: Primary | ICD-10-CM

## 2019-11-05 DIAGNOSIS — Z34.93 NORMAL PREGNANCY IN THIRD TRIMESTER: ICD-10-CM

## 2019-11-05 PROCEDURE — 86780 TREPONEMA PALLIDUM: CPT

## 2019-11-05 PROCEDURE — 85025 COMPLETE CBC W/AUTO DIFF WBC: CPT

## 2019-11-05 PROCEDURE — 87389 HIV-1 AG W/HIV-1&-2 AB AG IA: CPT

## 2019-11-05 PROCEDURE — 81002 URINALYSIS NONAUTO W/O SCOPE: CPT | Performed by: OBSTETRICS & GYNECOLOGY

## 2019-11-05 PROCEDURE — 0502F SUBSEQUENT PRENATAL CARE: CPT | Performed by: OBSTETRICS & GYNECOLOGY

## 2019-11-05 PROCEDURE — 36415 COLL VENOUS BLD VENIPUNCTURE: CPT

## 2019-11-11 ENCOUNTER — ROUTINE PRENATAL (OUTPATIENT)
Dept: OBGYN CLINIC | Facility: CLINIC | Age: 24
End: 2019-11-11
Payer: MEDICAID

## 2019-11-11 VITALS — WEIGHT: 176 LBS | BODY MASS INDEX: 33 KG/M2 | DIASTOLIC BLOOD PRESSURE: 78 MMHG | SYSTOLIC BLOOD PRESSURE: 130 MMHG

## 2019-11-11 DIAGNOSIS — Z34.93 NORMAL PREGNANCY IN THIRD TRIMESTER: Primary | ICD-10-CM

## 2019-11-11 PROCEDURE — 81002 URINALYSIS NONAUTO W/O SCOPE: CPT | Performed by: OBSTETRICS & GYNECOLOGY

## 2019-11-11 PROCEDURE — 0502F SUBSEQUENT PRENATAL CARE: CPT | Performed by: OBSTETRICS & GYNECOLOGY

## 2019-11-11 NOTE — PROGRESS NOTES
[facility], [facility]  Obstetrics and Gynecology  Prenatal Visit  Corazon Crockett MD    HPI   Grisel Verle Notice is a 21year old.o.  36w3d weeks. Here for routine prenatal visit and is without complaints.   Patient denies any regular uterine contracti

## 2019-11-18 ENCOUNTER — ROUTINE PRENATAL (OUTPATIENT)
Dept: OBGYN CLINIC | Facility: CLINIC | Age: 24
End: 2019-11-18
Payer: MEDICAID

## 2019-11-18 VITALS — DIASTOLIC BLOOD PRESSURE: 60 MMHG | SYSTOLIC BLOOD PRESSURE: 102 MMHG | BODY MASS INDEX: 33 KG/M2 | WEIGHT: 176 LBS

## 2019-11-18 DIAGNOSIS — Z34.93 NORMAL PREGNANCY IN THIRD TRIMESTER: Primary | ICD-10-CM

## 2019-11-18 PROCEDURE — 0502F SUBSEQUENT PRENATAL CARE: CPT | Performed by: OBSTETRICS & GYNECOLOGY

## 2019-11-18 PROCEDURE — 81002 URINALYSIS NONAUTO W/O SCOPE: CPT | Performed by: OBSTETRICS & GYNECOLOGY

## 2019-11-26 ENCOUNTER — ROUTINE PRENATAL (OUTPATIENT)
Dept: OBGYN CLINIC | Facility: CLINIC | Age: 24
End: 2019-11-26
Payer: MEDICAID

## 2019-11-26 VITALS — DIASTOLIC BLOOD PRESSURE: 64 MMHG | SYSTOLIC BLOOD PRESSURE: 110 MMHG | WEIGHT: 177.81 LBS | BODY MASS INDEX: 34 KG/M2

## 2019-11-26 DIAGNOSIS — Z34.83 ENCOUNTER FOR SUPERVISION OF OTHER NORMAL PREGNANCY IN THIRD TRIMESTER: Primary | ICD-10-CM

## 2019-11-26 PROCEDURE — 81002 URINALYSIS NONAUTO W/O SCOPE: CPT | Performed by: OBSTETRICS & GYNECOLOGY

## 2019-11-26 PROCEDURE — 0502F SUBSEQUENT PRENATAL CARE: CPT | Performed by: OBSTETRICS & GYNECOLOGY

## 2019-12-03 ENCOUNTER — ROUTINE PRENATAL (OUTPATIENT)
Dept: OBGYN CLINIC | Facility: CLINIC | Age: 24
End: 2019-12-03
Payer: MEDICAID

## 2019-12-03 VITALS — WEIGHT: 179 LBS | DIASTOLIC BLOOD PRESSURE: 62 MMHG | SYSTOLIC BLOOD PRESSURE: 106 MMHG | BODY MASS INDEX: 34 KG/M2

## 2019-12-03 DIAGNOSIS — Z34.83 ENCOUNTER FOR SUPERVISION OF OTHER NORMAL PREGNANCY IN THIRD TRIMESTER: Primary | ICD-10-CM

## 2019-12-03 PROCEDURE — 0502F SUBSEQUENT PRENATAL CARE: CPT | Performed by: OBSTETRICS & GYNECOLOGY

## 2019-12-03 PROCEDURE — 81002 URINALYSIS NONAUTO W/O SCOPE: CPT | Performed by: OBSTETRICS & GYNECOLOGY

## 2019-12-04 ENCOUNTER — ANESTHESIA (OUTPATIENT)
Dept: OBGYN UNIT | Facility: HOSPITAL | Age: 24
End: 2019-12-04
Payer: MEDICAID

## 2019-12-04 ENCOUNTER — HOSPITAL ENCOUNTER (INPATIENT)
Facility: HOSPITAL | Age: 24
LOS: 1 days | Discharge: HOME | End: 2019-12-05
Attending: OBSTETRICS & GYNECOLOGY | Admitting: OBSTETRICS & GYNECOLOGY
Payer: MEDICAID

## 2019-12-04 ENCOUNTER — ANESTHESIA EVENT (OUTPATIENT)
Dept: OBGYN UNIT | Facility: HOSPITAL | Age: 24
End: 2019-12-04
Payer: MEDICAID

## 2019-12-04 PROBLEM — Z34.90 PREGNANCY: Status: ACTIVE | Noted: 2019-12-04

## 2019-12-04 PROBLEM — Z34.90 PREGNANCY (HCC): Status: ACTIVE | Noted: 2019-12-04

## 2019-12-04 PROCEDURE — 0HQ9XZZ REPAIR PERINEUM SKIN, EXTERNAL APPROACH: ICD-10-PCS | Performed by: OBSTETRICS & GYNECOLOGY

## 2019-12-04 PROCEDURE — 59409 OBSTETRICAL CARE: CPT | Performed by: OBSTETRICS & GYNECOLOGY

## 2019-12-04 RX ORDER — EPHEDRINE SULFATE/0.9% NACL/PF 25 MG/5 ML
5 SYRINGE (ML) INTRAVENOUS AS NEEDED
Status: DISCONTINUED | OUTPATIENT
Start: 2019-12-04 | End: 2019-12-04

## 2019-12-04 RX ORDER — AMMONIA INHALANTS 0.04 G/.3ML
0.3 INHALANT RESPIRATORY (INHALATION) AS NEEDED
Status: DISCONTINUED | OUTPATIENT
Start: 2019-12-04 | End: 2019-12-05

## 2019-12-04 RX ORDER — NALBUPHINE HCL 10 MG/ML
2.5 AMPUL (ML) INJECTION
Status: DISCONTINUED | OUTPATIENT
Start: 2019-12-04 | End: 2019-12-04

## 2019-12-04 RX ORDER — DEXTROSE, SODIUM CHLORIDE, SODIUM LACTATE, POTASSIUM CHLORIDE, AND CALCIUM CHLORIDE 5; .6; .31; .03; .02 G/100ML; G/100ML; G/100ML; G/100ML; G/100ML
INJECTION, SOLUTION INTRAVENOUS CONTINUOUS
Status: DISCONTINUED | OUTPATIENT
Start: 2019-12-04 | End: 2019-12-04 | Stop reason: HOSPADM

## 2019-12-04 RX ORDER — DOCUSATE SODIUM 100 MG/1
100 CAPSULE, LIQUID FILLED ORAL 2 TIMES DAILY
Status: DISCONTINUED | OUTPATIENT
Start: 2019-12-04 | End: 2019-12-05

## 2019-12-04 RX ORDER — CHOLECALCIFEROL (VITAMIN D3) 25 MCG
1 TABLET,CHEWABLE ORAL DAILY
Status: DISCONTINUED | OUTPATIENT
Start: 2019-12-04 | End: 2019-12-05

## 2019-12-04 RX ORDER — DIAPER,BRIEF,INFANT-TODD,DISP
1 EACH MISCELLANEOUS EVERY 6 HOURS PRN
Status: DISCONTINUED | OUTPATIENT
Start: 2019-12-04 | End: 2019-12-05

## 2019-12-04 RX ORDER — SODIUM CHLORIDE 0.9 % (FLUSH) 0.9 %
10 SYRINGE (ML) INJECTION AS NEEDED
Status: DISCONTINUED | OUTPATIENT
Start: 2019-12-04 | End: 2019-12-05

## 2019-12-04 RX ORDER — LIDOCAINE HYDROCHLORIDE 10 MG/ML
INJECTION, SOLUTION EPIDURAL; INFILTRATION; INTRACAUDAL; PERINEURAL AS NEEDED
Status: DISCONTINUED | OUTPATIENT
Start: 2019-12-04 | End: 2019-12-04 | Stop reason: SURG

## 2019-12-04 RX ORDER — LIDOCAINE HYDROCHLORIDE AND EPINEPHRINE 15; 5 MG/ML; UG/ML
INJECTION, SOLUTION EPIDURAL AS NEEDED
Status: DISCONTINUED | OUTPATIENT
Start: 2019-12-04 | End: 2019-12-04 | Stop reason: SURG

## 2019-12-04 RX ORDER — IBUPROFEN 600 MG/1
600 TABLET ORAL ONCE AS NEEDED
Status: DISCONTINUED | OUTPATIENT
Start: 2019-12-04 | End: 2019-12-04 | Stop reason: HOSPADM

## 2019-12-04 RX ORDER — LIDOCAINE HYDROCHLORIDE 10 MG/ML
30 INJECTION, SOLUTION EPIDURAL; INFILTRATION; INTRACAUDAL; PERINEURAL ONCE
Status: DISCONTINUED | OUTPATIENT
Start: 2019-12-04 | End: 2019-12-04 | Stop reason: HOSPADM

## 2019-12-04 RX ORDER — SIMETHICONE 80 MG
80 TABLET,CHEWABLE ORAL 3 TIMES DAILY PRN
Status: DISCONTINUED | OUTPATIENT
Start: 2019-12-04 | End: 2019-12-05

## 2019-12-04 RX ORDER — AMMONIA INHALANTS 0.04 G/.3ML
0.3 INHALANT RESPIRATORY (INHALATION) AS NEEDED
Status: DISCONTINUED | OUTPATIENT
Start: 2019-12-04 | End: 2019-12-04 | Stop reason: HOSPADM

## 2019-12-04 RX ORDER — TERBUTALINE SULFATE 1 MG/ML
0.25 INJECTION, SOLUTION SUBCUTANEOUS AS NEEDED
Status: DISCONTINUED | OUTPATIENT
Start: 2019-12-04 | End: 2019-12-04 | Stop reason: HOSPADM

## 2019-12-04 RX ORDER — IBUPROFEN 600 MG/1
600 TABLET ORAL EVERY 6 HOURS PRN
Status: DISCONTINUED | OUTPATIENT
Start: 2019-12-04 | End: 2019-12-05

## 2019-12-04 RX ORDER — BUPIVACAINE HYDROCHLORIDE 2.5 MG/ML
10 INJECTION, SOLUTION EPIDURAL; INFILTRATION; INTRACAUDAL ONCE
Status: DISCONTINUED | OUTPATIENT
Start: 2019-12-04 | End: 2019-12-04

## 2019-12-04 RX ORDER — SODIUM CHLORIDE, SODIUM LACTATE, POTASSIUM CHLORIDE, CALCIUM CHLORIDE 600; 310; 30; 20 MG/100ML; MG/100ML; MG/100ML; MG/100ML
INJECTION, SOLUTION INTRAVENOUS CONTINUOUS
Status: DISCONTINUED | OUTPATIENT
Start: 2019-12-04 | End: 2019-12-04 | Stop reason: HOSPADM

## 2019-12-04 RX ORDER — ONDANSETRON 2 MG/ML
4 INJECTION INTRAMUSCULAR; INTRAVENOUS EVERY 6 HOURS PRN
Status: DISCONTINUED | OUTPATIENT
Start: 2019-12-04 | End: 2019-12-05

## 2019-12-04 RX ORDER — BISACODYL 10 MG
10 SUPPOSITORY, RECTAL RECTAL ONCE AS NEEDED
Status: DISCONTINUED | OUTPATIENT
Start: 2019-12-04 | End: 2019-12-05

## 2019-12-04 RX ORDER — SODIUM CHLORIDE 0.9 % (FLUSH) 0.9 %
10 SYRINGE (ML) INJECTION AS NEEDED
Status: DISCONTINUED | OUTPATIENT
Start: 2019-12-04 | End: 2019-12-04 | Stop reason: HOSPADM

## 2019-12-04 RX ORDER — TRISODIUM CITRATE DIHYDRATE AND CITRIC ACID MONOHYDRATE 500; 334 MG/5ML; MG/5ML
30 SOLUTION ORAL AS NEEDED
Status: DISCONTINUED | OUTPATIENT
Start: 2019-12-04 | End: 2019-12-04 | Stop reason: HOSPADM

## 2019-12-04 RX ORDER — LIDOCAINE HYDROCHLORIDE AND EPINEPHRINE 20; 5 MG/ML; UG/ML
10 INJECTION, SOLUTION EPIDURAL; INFILTRATION; INTRACAUDAL; PERINEURAL ONCE
Status: DISCONTINUED | OUTPATIENT
Start: 2019-12-04 | End: 2019-12-04

## 2019-12-04 RX ADMIN — LIDOCAINE HYDROCHLORIDE AND EPINEPHRINE 3 ML: 15; 5 INJECTION, SOLUTION EPIDURAL at 05:41:00

## 2019-12-04 RX ADMIN — LIDOCAINE HYDROCHLORIDE 3 ML: 10 INJECTION, SOLUTION EPIDURAL; INFILTRATION; INTRACAUDAL; PERINEURAL at 05:34:00

## 2019-12-04 NOTE — ANESTHESIA PROCEDURE NOTES
Labor Analgesia  Performed by: Deana An MD  Authorized by: Deana An MD       General Information and Staff    Start Time:  12/4/2019 5:32 AM  End Time:  12/4/2019 5:46 AM  Anesthesiologist:  Deana An MD  Patient Location:

## 2019-12-04 NOTE — ANESTHESIA POSTPROCEDURE EVALUATION
Patient: Prashanth Villarreal    Procedure Summary     Date:  12/04/19 Room / Location:      Anesthesia Start:  0532 Anesthesia Stop:  3230    Procedure:  LABOR ANALGESIA Diagnosis:      Scheduled Providers:   Anesthesiologist:  Oracio Bhat MD

## 2019-12-04 NOTE — PROGRESS NOTES
[facility]    Labor Progress Note    Delmy Montague Patient Status:  Inpatient    1995 MRN [id number]   Location [address]  Attending Rena Mcintosh MD   Hosp Day # 0 PCP Ankit Pinedo MD

## 2019-12-04 NOTE — L&D DELIVERY NOTE
[facility]P - [facility]    Vaginal Delivery Note    [place] Crew Patient Status:  Inpatient    1995 MRN [id number]   Location [address]  Attending Jt Arias MD   Hosp Day # 0 PCP Krystle Peacock MD

## 2019-12-04 NOTE — PROGRESS NOTES
Patient unable to void. striaight cath for 500 mls. Patient transferred to mother/baby room 367 pereelchair in stable condition with baby and personal belongings. Accompanied by significant other and staff. Report given to mother/baby RN.

## 2019-12-04 NOTE — ANESTHESIA PREPROCEDURE EVALUATION
Anesthesia PreOp Note    HPI:     Jadon Galeas is a 21year old female who presents for preoperative consultation requested by: * No surgeons listed *    Date of Surgery: 12/4/2019    * No procedures listed *  Indication: * No pre-op diagnosis ent Sharri Ochoa MD  fentaNYL citrate (SUBLIMAZE) 0.05 MG/ML injection 50 mcg, 50 mcg, Intravenous, Q30 Min PRN, Rob Tony MD  lactated ringers IV bolus 1,000 mL, 1,000 mL, Intravenous, Once, Nicolas Fountain MD  fentaNYL 2mcg/ml & bupivacaine Social connections:        Talks on phone: Not on file        Gets together: Not on file        Attends Rastafari service: Not on file        Active member of club or organization: Not on file        Attends meetings of clubs or organizations: Not on fi ALEJANDRO  12/4/2019 5:44 AM

## 2019-12-04 NOTE — PROGRESS NOTES
[facility]    Labor progress note    Cynda Party Patient Status:  Inpatient    1995 MRN [id number]   Location [address]  Attending Anita Ndiaye MD   Hosp Day # 0 PCP Hannah Apodaca MD     D intraepithelial lesion or malignancy 06/12/19 2331    HPV        GC DNA        Chlamydia DNA        GTT 1 Hr        Glucose Fasting        Glucose 1 Hr        Glucose 2 Hr        Glucose 3 Hr        HgB A1c        Vitamin D              8-20 Weeks     Test GC DNA        Chlamydia DNA              Optional Labs     Test Value Reference Range Date Time    HgB A1c        HGB Electrophoresis        Varicella Zoster        Cystic Fibrosis-Old         Cystic Fibrosis[32] (Required questions in OE to answer) °F (37.2 °C)  Pulse:  [60-90] 66  Resp:  [16] 16  BP: ()/(37-85) 117/54    Constitutional: alert and cooperative  Abdomen: Gravid, nontender uterus with fundal height appropriate for gestational age  Fetal Surveillance: 150s BPM; Fetal heart variabil

## 2019-12-04 NOTE — H&P
[address] Patient Status:  Inpatient    1995 MRN [id number]   Location [address] [facility] Attending Cristian Beck MD   Hosp Day # 0 PCP Juanis Conner MD     Da 127/73    Constitutional: alert, appears stated age and cooperative  Respiratory: clear to auscultation bilaterally  Cardiac: regular rate and rhythm, S1, S2 normal, no murmur, click, rub or gallop  Abdomen: gravid  Presentation: vertex  EFW:  7 lb  Fetal

## 2019-12-05 VITALS
TEMPERATURE: 97 F | OXYGEN SATURATION: 98 % | HEART RATE: 68 BPM | SYSTOLIC BLOOD PRESSURE: 119 MMHG | DIASTOLIC BLOOD PRESSURE: 54 MMHG | RESPIRATION RATE: 16 BRPM

## 2019-12-05 RX ORDER — PSEUDOEPHEDRINE HCL 30 MG
100 TABLET ORAL 2 TIMES DAILY
Qty: 30 CAPSULE | Refills: 0 | Status: SHIPPED | OUTPATIENT
Start: 2019-12-05 | End: 2020-01-21

## 2019-12-05 RX ORDER — IBUPROFEN 600 MG/1
600 TABLET ORAL EVERY 6 HOURS PRN
Qty: 30 TABLET | Refills: 0 | Status: SHIPPED | OUTPATIENT
Start: 2019-12-05 | End: 2020-01-21

## 2019-12-05 NOTE — PLAN OF CARE
Problem: GENITOURINARY - ADULT  Goal: Absence of urinary retention  Description  INTERVENTIONS:  - Assess patient’s ability to void and empty bladder  - Monitor intake/output and perform bladder scan as needed  - Follow urinary retention protocol/standar breast feeding aids (e.g., infant sling, nursing footstool/pillows, and breast pumps). - Encourage mother/other family members to express feelings/concerns, and actively listen.   - Educate father/SO about benefits of breast feeding and how to manage commo ambulation. Denies SOB, chest pain, painful urination, and/or calf pain. Interacting appropriately with infant. Paperwork given  Family at bedside throughout shift.      Mother educated on plan of care, pain medication schedule, infant testing needed to

## 2019-12-05 NOTE — PLAN OF CARE
Problem: GENITOURINARY - ADULT  Goal: Absence of urinary retention  Description  INTERVENTIONS:  - Assess patient’s ability to void and empty bladder  - Monitor intake/output and perform bladder scan as needed  - Follow urinary retention protocol/standar breast feeding aids (e.g., infant sling, nursing footstool/pillows, and breast pumps). - Encourage mother/other family members to express feelings/concerns, and actively listen.   - Educate father/SO about benefits of breast feeding and how to manage commo feeding. Voiding independently. Lochia is WNL. Uterus is firm. Bonding with .

## 2019-12-05 NOTE — DISCHARGE SUMMARY
[facility]    Discharge Summary/Discharge Note    Nancy Santizo Patient Status:  Inpatient    1995 MRN [id number]   Location [facility] 3SE Attending Eusebio Arguello MD   Hosp Day # 1 PCP Chilango Diaz MD Assessment/Plan     A: 21 y.o. [id number] status post normal spontaneous vaginal delivery. Patient is postpartum day #1 in good condition. Patient is breast-feeding. Patient has requested to go home today.     Plan for discharge home this evening pend MG Oral Cap  Take 100 mg by mouth 2 (two) times daily. ibuprofen 600 MG Oral Tab  Take 1 tablet (600 mg total) by mouth every 6 (six) hours as needed.       Home Meds - Unchanged    Prenatal Vit-Fe Fumarate-FA (PRENATAL PLUS) 27-1 MG Oral Tab  Take 1 tab

## 2019-12-05 NOTE — PAYOR COMM NOTE
--------------  ADMISSION REVIEW     Payor: Mckay Pederson #:  [id number]  Authorization Number: [id number]    Admit date: 12/4/19  Admit time: 4550       History & Physical    Date of Admission:  12/4/2019    HPI: Gwyn Villarreal is at an estimated gestational age of 38w11d with an estimated date of delivery of: 12/6/2019, by Last Menstrual Period  Active labor. Received Fentanyl for pain.   Requesting epidural.      Vaginal Delivery Not

## 2019-12-06 NOTE — PLAN OF CARE
Problem: GENITOURINARY - ADULT  Goal: Absence of urinary retention  Description  INTERVENTIONS:  - Assess patient’s ability to void and empty bladder  - Monitor intake/output and perform bladder scan as needed  - Follow urinary retention protocol/standar with breast feeding.  - Provide information as needed about early infant feeding cues (e.g., rooting, lip smacking, sucking fingers/hand) versus late cue of crying.  - Discuss/demonstrate breast feeding aids (e.g., infant sling, nursing footstool/pillows, status and coping mechanisms. - Encourage caregiver to participate in  daily care. - Assess support systems available to mother/family.  - Provide /case management support as needed.   2019 1837 by Stefania Keen RN  Outcome: Co

## 2020-01-21 ENCOUNTER — POSTPARTUM (OUTPATIENT)
Dept: OBGYN CLINIC | Facility: CLINIC | Age: 25
End: 2020-01-21
Payer: MEDICAID

## 2020-01-21 VITALS
SYSTOLIC BLOOD PRESSURE: 117 MMHG | DIASTOLIC BLOOD PRESSURE: 78 MMHG | HEART RATE: 68 BPM | BODY MASS INDEX: 30 KG/M2 | WEIGHT: 160 LBS

## 2020-01-21 DIAGNOSIS — Z30.013 ENCOUNTER FOR INITIAL PRESCRIPTION OF INJECTABLE CONTRACEPTIVE: ICD-10-CM

## 2020-01-21 PROBLEM — Z33.1 IUP (INTRAUTERINE PREGNANCY), INCIDENTAL: Status: RESOLVED | Noted: 2019-05-28 | Resolved: 2020-01-21

## 2020-01-21 PROBLEM — Z34.90 SUPERVISION OF NORMAL PREGNANCY: Status: RESOLVED | Noted: 2019-06-12 | Resolved: 2020-01-21

## 2020-01-21 PROBLEM — Z34.90 SUPERVISION OF NORMAL PREGNANCY (HCC): Status: RESOLVED | Noted: 2019-06-12 | Resolved: 2020-01-21

## 2020-01-21 PROBLEM — Z34.90 PREGNANCY (HCC): Status: RESOLVED | Noted: 2019-12-04 | Resolved: 2020-01-21

## 2020-01-21 PROBLEM — Z33.1 IUP (INTRAUTERINE PREGNANCY), INCIDENTAL (HCC): Status: RESOLVED | Noted: 2019-05-28 | Resolved: 2020-01-21

## 2020-01-21 PROBLEM — Z34.90 PREGNANCY: Status: RESOLVED | Noted: 2019-12-04 | Resolved: 2020-01-21

## 2020-01-21 LAB
CONTROL LINE PRESENT WITH A CLEAR BACKGROUND (YES/NO): YES YES/NO
KIT LOT #: NORMAL NUMERIC
PREGNANCY TEST, URINE: NEGATIVE

## 2020-01-21 PROCEDURE — 96372 THER/PROPH/DIAG INJ SC/IM: CPT | Performed by: OBSTETRICS & GYNECOLOGY

## 2020-01-21 PROCEDURE — 0503F POSTPARTUM CARE VISIT: CPT | Performed by: OBSTETRICS & GYNECOLOGY

## 2020-01-21 PROCEDURE — 81025 URINE PREGNANCY TEST: CPT | Performed by: OBSTETRICS & GYNECOLOGY

## 2020-01-21 RX ORDER — MEDROXYPROGESTERONE ACETATE 150 MG/ML
150 INJECTION, SUSPENSION INTRAMUSCULAR
Status: DISCONTINUED | OUTPATIENT
Start: 2020-01-21 | End: 2020-01-21

## 2020-01-21 RX ORDER — MEDROXYPROGESTERONE ACETATE 150 MG/ML
150 INJECTION, SUSPENSION INTRAMUSCULAR
Status: SHIPPED | OUTPATIENT
Start: 2020-01-21 | End: 2021-01-15

## 2020-01-21 RX ORDER — MEDROXYPROGESTERONE ACETATE 150 MG/ML
150 INJECTION, SUSPENSION INTRAMUSCULAR ONCE
Status: COMPLETED | OUTPATIENT
Start: 2020-01-21 | End: 2020-01-21

## 2020-01-21 RX ADMIN — MEDROXYPROGESTERONE ACETATE 150 MG: 150 INJECTION, SUSPENSION INTRAMUSCULAR at 15:00:00

## 2020-01-21 NOTE — PROGRESS NOTES
HPI    Delmy Montague is a 21year old female  here for 6 week post-partum visit. Patient delivered a  male infant on 19 via .     Pt states she is voiding freely, tolerating general diet, having normal bowel movements and currently masses    ASSESSMENT/PLAN  21year old female  here for 6 week post-partum visit  Discussed return to fertility and menses. Patient counseled on contraceptive options. Patient has chosen Depo-provera.   Discussed risks, use and side effects of depo

## 2020-03-30 ENCOUNTER — TELEPHONE (OUTPATIENT)
Dept: OBGYN CLINIC | Facility: CLINIC | Age: 25
End: 2020-03-30

## 2020-03-30 NOTE — TELEPHONE ENCOUNTER
I believe Depo Provera shots are being rescheduled for 4-8 weeks. Patients to use condoms in interim.

## 2020-05-12 ENCOUNTER — TELEPHONE (OUTPATIENT)
Dept: OBGYN CLINIC | Facility: CLINIC | Age: 25
End: 2020-05-12

## 2020-07-13 ENCOUNTER — TELEPHONE (OUTPATIENT)
Dept: OBGYN CLINIC | Facility: CLINIC | Age: 25
End: 2020-07-13

## 2020-07-13 NOTE — TELEPHONE ENCOUNTER
Pt Name and  verified. Pt wanted to know if there was another date available and advised pt there wasn't at the time that she was requesting. Will keep apt as is.

## 2020-07-30 ENCOUNTER — OFFICE VISIT (OUTPATIENT)
Dept: OBGYN CLINIC | Facility: CLINIC | Age: 25
End: 2020-07-30
Payer: MEDICAID

## 2020-07-30 VITALS — SYSTOLIC BLOOD PRESSURE: 124 MMHG | WEIGHT: 164 LBS | DIASTOLIC BLOOD PRESSURE: 76 MMHG | BODY MASS INDEX: 31 KG/M2

## 2020-07-30 DIAGNOSIS — Z01.812 PRE-PROCEDURAL LABORATORY EXAMINATION: Primary | ICD-10-CM

## 2020-07-30 DIAGNOSIS — Z30.017 INSERTION OF IMPLANTABLE SUBDERMAL CONTRACEPTIVE: ICD-10-CM

## 2020-07-30 PROCEDURE — 11981 INSERTION DRUG DLVR IMPLANT: CPT | Performed by: OBSTETRICS & GYNECOLOGY

## 2020-07-30 PROCEDURE — 3078F DIAST BP <80 MM HG: CPT | Performed by: OBSTETRICS & GYNECOLOGY

## 2020-07-30 PROCEDURE — 81025 URINE PREGNANCY TEST: CPT | Performed by: OBSTETRICS & GYNECOLOGY

## 2020-07-30 PROCEDURE — 3074F SYST BP LT 130 MM HG: CPT | Performed by: OBSTETRICS & GYNECOLOGY

## 2020-07-30 NOTE — PROCEDURES
I discussed the Nexplanon implant. We reviewed that it is good for 3 years but may be removed at any time prior to that due to either side effects or if the patient desires pregnancy. We reviewed insertion with very small risk of infection.   I discussed device was withdrawn. Visit Plan:  Steri-Strips were applied to the skin incision and it was covered with a band-aid. A bandage was wrapped around the injected arm. Both Physician and pt confirmed device by tactile feel.   Patient was instructed to r

## 2021-04-20 ENCOUNTER — TELEPHONE (OUTPATIENT)
Dept: SCHEDULING | Age: 26
End: 2021-04-20

## 2021-04-22 ENCOUNTER — OFFICE VISIT (OUTPATIENT)
Dept: INTERNAL MEDICINE | Age: 26
End: 2021-04-22

## 2021-04-22 ENCOUNTER — LAB SERVICES (OUTPATIENT)
Dept: LAB | Age: 26
End: 2021-04-22

## 2021-04-22 VITALS
HEART RATE: 52 BPM | TEMPERATURE: 96.3 F | HEIGHT: 63 IN | WEIGHT: 146 LBS | BODY MASS INDEX: 25.87 KG/M2 | OXYGEN SATURATION: 99 % | SYSTOLIC BLOOD PRESSURE: 100 MMHG | DIASTOLIC BLOOD PRESSURE: 60 MMHG

## 2021-04-22 DIAGNOSIS — K14.0 GLOSSITIS: ICD-10-CM

## 2021-04-22 DIAGNOSIS — Z00.00 HEALTH MAINTENANCE EXAMINATION: ICD-10-CM

## 2021-04-22 DIAGNOSIS — R43.2 DYSGEUSIA: Primary | ICD-10-CM

## 2021-04-22 LAB
ALBUMIN SERPL-MCNC: 3.7 G/DL (ref 3.6–5.1)
ALBUMIN/GLOB SERPL: 1 {RATIO} (ref 1–2.4)
ALP SERPL-CCNC: 84 UNITS/L (ref 45–117)
ALT SERPL-CCNC: 19 UNITS/L
ANION GAP SERPL CALC-SCNC: 6 MMOL/L (ref 10–20)
AST SERPL-CCNC: 11 UNITS/L
BASOPHILS # BLD: 0 K/MCL (ref 0–0.3)
BASOPHILS NFR BLD: 0 %
BILIRUB SERPL-MCNC: 0.9 MG/DL (ref 0.2–1)
BUN SERPL-MCNC: 5 MG/DL (ref 6–20)
BUN/CREAT SERPL: 9 (ref 7–25)
CALCIUM SERPL-MCNC: 9 MG/DL (ref 8.4–10.2)
CHLORIDE SERPL-SCNC: 109 MMOL/L (ref 98–107)
CHOLEST SERPL-MCNC: 107 MG/DL
CHOLEST/HDLC SERPL: 3 {RATIO}
CO2 SERPL-SCNC: 29 MMOL/L (ref 21–32)
CREAT SERPL-MCNC: 0.56 MG/DL (ref 0.51–0.95)
DEPRECATED RDW RBC: 41.1 FL (ref 39–50)
EOSINOPHIL # BLD: 0.2 K/MCL (ref 0–0.5)
EOSINOPHIL NFR BLD: 5 %
ERYTHROCYTE [DISTWIDTH] IN BLOOD: 12.5 % (ref 11–15)
FASTING DURATION TIME PATIENT: 12 HOURS
FASTING DURATION TIME PATIENT: 12 HOURS
GFR SERPLBLD BASED ON 1.73 SQ M-ARVRAT: >90 ML/MIN/1.73M2
GLOBULIN SER-MCNC: 3.8 G/DL (ref 2–4)
GLUCOSE SERPL-MCNC: 97 MG/DL (ref 65–99)
HCT VFR BLD CALC: 39.4 % (ref 36–46.5)
HDLC SERPL-MCNC: 36 MG/DL
HGB BLD-MCNC: 12.8 G/DL (ref 12–15.5)
IMM GRANULOCYTES # BLD AUTO: 0 K/MCL (ref 0–0.2)
IMM GRANULOCYTES # BLD: 0 %
LDLC SERPL CALC-MCNC: 52 MG/DL
LYMPHOCYTES # BLD: 1.3 K/MCL (ref 1–4.8)
LYMPHOCYTES NFR BLD: 25 %
MCH RBC QN AUTO: 28.6 PG (ref 26–34)
MCHC RBC AUTO-ENTMCNC: 32.5 G/DL (ref 32–36.5)
MCV RBC AUTO: 88.1 FL (ref 78–100)
MONOCYTES # BLD: 0.4 K/MCL (ref 0.3–0.9)
MONOCYTES NFR BLD: 8 %
NEUTROPHILS # BLD: 3.1 K/MCL (ref 1.8–7.7)
NEUTROPHILS NFR BLD: 62 %
NONHDLC SERPL-MCNC: 71 MG/DL
NRBC BLD MANUAL-RTO: 0 /100 WBC
PLATELET # BLD AUTO: 201 K/MCL (ref 140–450)
POTASSIUM SERPL-SCNC: 4.3 MMOL/L (ref 3.4–5.1)
PROT SERPL-MCNC: 7.5 G/DL (ref 6.4–8.2)
RBC # BLD: 4.47 MIL/MCL (ref 4–5.2)
SODIUM SERPL-SCNC: 140 MMOL/L (ref 135–145)
TRIGL SERPL-MCNC: 97 MG/DL
TSH SERPL-ACNC: 1.05 MCUNITS/ML (ref 0.35–5)
VIT B12 SERPL-MCNC: 568 PG/ML (ref 211–911)
WBC # BLD: 5.1 K/MCL (ref 4.2–11)

## 2021-04-22 PROCEDURE — 80053 COMPREHEN METABOLIC PANEL: CPT | Performed by: INTERNAL MEDICINE

## 2021-04-22 PROCEDURE — 82607 VITAMIN B-12: CPT | Performed by: INTERNAL MEDICINE

## 2021-04-22 PROCEDURE — 84443 ASSAY THYROID STIM HORMONE: CPT | Performed by: INTERNAL MEDICINE

## 2021-04-22 PROCEDURE — 36415 COLL VENOUS BLD VENIPUNCTURE: CPT | Performed by: INTERNAL MEDICINE

## 2021-04-22 PROCEDURE — 80061 LIPID PANEL: CPT | Performed by: INTERNAL MEDICINE

## 2021-04-22 PROCEDURE — 99203 OFFICE O/P NEW LOW 30 MIN: CPT | Performed by: INTERNAL MEDICINE

## 2021-04-22 PROCEDURE — 85025 COMPLETE CBC W/AUTO DIFF WBC: CPT | Performed by: INTERNAL MEDICINE

## 2021-04-22 SDOH — HEALTH STABILITY: MENTAL HEALTH: HOW OFTEN DO YOU HAVE A DRINK CONTAINING ALCOHOL?: NEVER

## 2021-04-22 ASSESSMENT — PATIENT HEALTH QUESTIONNAIRE - PHQ9
2. FEELING DOWN, DEPRESSED OR HOPELESS: NOT AT ALL
CLINICAL INTERPRETATION OF PHQ9 SCORE: NO FURTHER SCREENING NEEDED
CLINICAL INTERPRETATION OF PHQ2 SCORE: NO FURTHER SCREENING NEEDED
SUM OF ALL RESPONSES TO PHQ9 QUESTIONS 1 AND 2: 0
SUM OF ALL RESPONSES TO PHQ9 QUESTIONS 1 AND 2: 0
1. LITTLE INTEREST OR PLEASURE IN DOING THINGS: NOT AT ALL

## 2021-04-22 ASSESSMENT — ENCOUNTER SYMPTOMS
RESPIRATORY NEGATIVE: 1
ENDOCRINE NEGATIVE: 1
CONSTITUTIONAL NEGATIVE: 1
ALLERGIC/IMMUNOLOGIC NEGATIVE: 1
NEUROLOGICAL NEGATIVE: 1
GASTROINTESTINAL NEGATIVE: 1
EYES NEGATIVE: 1
HEMATOLOGIC/LYMPHATIC NEGATIVE: 1
PSYCHIATRIC NEGATIVE: 1

## 2021-04-26 ENCOUNTER — TELEPHONE (OUTPATIENT)
Dept: INTERNAL MEDICINE | Age: 26
End: 2021-04-26

## 2021-04-26 ENCOUNTER — TELEPHONE (OUTPATIENT)
Dept: SCHEDULING | Age: 26
End: 2021-04-26

## 2021-05-05 ENCOUNTER — OFFICE VISIT (OUTPATIENT)
Dept: INTERNAL MEDICINE | Age: 26
End: 2021-05-05

## 2021-05-05 VITALS
TEMPERATURE: 96.3 F | SYSTOLIC BLOOD PRESSURE: 100 MMHG | HEART RATE: 53 BPM | OXYGEN SATURATION: 99 % | HEIGHT: 64 IN | BODY MASS INDEX: 24.16 KG/M2 | WEIGHT: 141.54 LBS | DIASTOLIC BLOOD PRESSURE: 60 MMHG

## 2021-05-05 DIAGNOSIS — Z23 NEED FOR HPV VACCINATION: ICD-10-CM

## 2021-05-05 DIAGNOSIS — H93.8X1 MASS OF RIGHT EAR CANAL: ICD-10-CM

## 2021-05-05 DIAGNOSIS — Z00.00 HEALTH MAINTENANCE EXAMINATION: Primary | ICD-10-CM

## 2021-05-05 DIAGNOSIS — K14.0 GLOSSITIS: ICD-10-CM

## 2021-05-05 DIAGNOSIS — R43.2 DYSGEUSIA: ICD-10-CM

## 2021-05-05 PROCEDURE — 99395 PREV VISIT EST AGE 18-39: CPT | Performed by: INTERNAL MEDICINE

## 2021-05-05 PROCEDURE — 90651 9VHPV VACCINE 2/3 DOSE IM: CPT

## 2021-05-05 ASSESSMENT — ENCOUNTER SYMPTOMS
EYES NEGATIVE: 1
ALLERGIC/IMMUNOLOGIC NEGATIVE: 1
RESPIRATORY NEGATIVE: 1
HEMATOLOGIC/LYMPHATIC NEGATIVE: 1
CONSTITUTIONAL NEGATIVE: 1
NEUROLOGICAL NEGATIVE: 1
ENDOCRINE NEGATIVE: 1
GASTROINTESTINAL NEGATIVE: 1
PSYCHIATRIC NEGATIVE: 1

## 2021-05-05 ASSESSMENT — PATIENT HEALTH QUESTIONNAIRE - PHQ9
SUM OF ALL RESPONSES TO PHQ9 QUESTIONS 1 AND 2: 0
2. FEELING DOWN, DEPRESSED OR HOPELESS: NOT AT ALL
CLINICAL INTERPRETATION OF PHQ2 SCORE: NO FURTHER SCREENING NEEDED
1. LITTLE INTEREST OR PLEASURE IN DOING THINGS: NOT AT ALL
SUM OF ALL RESPONSES TO PHQ9 QUESTIONS 1 AND 2: 0
CLINICAL INTERPRETATION OF PHQ9 SCORE: NO FURTHER SCREENING NEEDED

## 2021-09-15 NOTE — TELEPHONE ENCOUNTER
Breakfast tray ordered for pt.       Jarrod Odonnell RN  09/15/21 7523 per pt rt call.  Please advise

## 2021-09-28 ENCOUNTER — OFFICE VISIT (OUTPATIENT)
Dept: OBGYN CLINIC | Facility: CLINIC | Age: 26
End: 2021-09-28
Payer: MEDICAID

## 2021-09-28 VITALS — WEIGHT: 134.81 LBS | SYSTOLIC BLOOD PRESSURE: 110 MMHG | DIASTOLIC BLOOD PRESSURE: 60 MMHG | BODY MASS INDEX: 25 KG/M2

## 2021-09-28 DIAGNOSIS — R10.2 SUPRAPUBIC PAIN: ICD-10-CM

## 2021-09-28 DIAGNOSIS — N92.1 METRORRHAGIA: ICD-10-CM

## 2021-09-28 DIAGNOSIS — R39.15 URINARY URGENCY: ICD-10-CM

## 2021-09-28 DIAGNOSIS — N92.6 IRREGULAR PERIODS: Primary | ICD-10-CM

## 2021-09-28 LAB
CONTROL LINE PRESENT WITH A CLEAR BACKGROUND (YES/NO): YES YES/NO
KIT LOT #: NORMAL NUMERIC

## 2021-09-28 PROCEDURE — 3074F SYST BP LT 130 MM HG: CPT | Performed by: OBSTETRICS & GYNECOLOGY

## 2021-09-28 PROCEDURE — 99214 OFFICE O/P EST MOD 30 MIN: CPT | Performed by: OBSTETRICS & GYNECOLOGY

## 2021-09-28 PROCEDURE — 3078F DIAST BP <80 MM HG: CPT | Performed by: OBSTETRICS & GYNECOLOGY

## 2021-09-28 PROCEDURE — 81025 URINE PREGNANCY TEST: CPT | Performed by: OBSTETRICS & GYNECOLOGY

## 2021-09-28 RX ORDER — NITROFURANTOIN 25; 75 MG/1; MG/1
100 CAPSULE ORAL 2 TIMES DAILY
Qty: 14 CAPSULE | Refills: 0 | Status: SHIPPED | OUTPATIENT
Start: 2021-09-28 | End: 2021-10-05

## 2021-09-28 NOTE — PROGRESS NOTES
HPI:   Nikolai Phelan is a 31year old female who presents for a    1)  Metrorrhagia/abnormal menses:    hx of nexplanon use since jun 2020, had no menses until 4 months ago when she started having light bleeding/spotting every day for the last 4 mo Use      Smoking status: Never Smoker      Smokeless tobacco: Never Used    Vaping Use      Vaping Use: Never used    Alcohol use: No      Alcohol/week: 0.0 standard drinks    Drug use: No           REVIEW OF SYSTEMS:   GENERAL: feels well otherwise  SKIN: specimen now. 2)  Suprapubic pain/urinary urgency: Patient stated that she has been having suprapubic pain and urinary urgency for several days which is not getting better.   The patient was counseled on possible etiologies including the most likely wilfrido

## 2021-10-19 ENCOUNTER — TELEPHONE (OUTPATIENT)
Dept: OBGYN CLINIC | Facility: CLINIC | Age: 26
End: 2021-10-19

## 2021-10-19 NOTE — TELEPHONE ENCOUNTER
Primary Diagnosis Code: N92.6 Description: Irregular menstruation, unspecified  Secondary Diagnosis Code: N92.1 Description: Excessive and frequent menstruation with irregular cycle  Date of Service: Not provided    CPT Code: 54409 Description: [facility]

## 2021-10-19 NOTE — TELEPHONE ENCOUNTER
Moriah Asher, [address] [facility] calling for mutual patient for pelvic ultra sound scheduled tomorrow 10/20. Patient requires prior authorization, please call Dada at [phone],[facility].

## 2021-10-20 ENCOUNTER — HOSPITAL ENCOUNTER (OUTPATIENT)
Dept: ULTRASOUND IMAGING | Age: 26
Discharge: HOME | End: 2021-10-20
Attending: OBSTETRICS & GYNECOLOGY
Payer: MEDICAID

## 2021-10-20 DIAGNOSIS — N92.6 IRREGULAR PERIODS: ICD-10-CM

## 2021-10-20 DIAGNOSIS — N92.1 METRORRHAGIA: ICD-10-CM

## 2021-10-20 PROCEDURE — 76830 TRANSVAGINAL US NON-OB: CPT | Performed by: OBSTETRICS & GYNECOLOGY

## 2021-10-20 PROCEDURE — 76856 US EXAM PELVIC COMPLETE: CPT | Performed by: OBSTETRICS & GYNECOLOGY

## 2021-11-04 ENCOUNTER — TELEPHONE (OUTPATIENT)
Dept: OBGYN CLINIC | Facility: CLINIC | Age: 26
End: 2021-11-04

## 2022-05-17 ENCOUNTER — OFFICE VISIT (OUTPATIENT)
Dept: OBGYN CLINIC | Facility: CLINIC | Age: 27
End: 2022-05-17
Payer: MEDICAID

## 2022-05-17 VITALS
DIASTOLIC BLOOD PRESSURE: 67 MMHG | WEIGHT: 149 LBS | HEART RATE: 66 BPM | SYSTOLIC BLOOD PRESSURE: 109 MMHG | BODY MASS INDEX: 28 KG/M2

## 2022-05-17 DIAGNOSIS — Z30.46 ENCOUNTER FOR NEXPLANON REMOVAL: Primary | ICD-10-CM

## 2022-05-17 PROCEDURE — 3074F SYST BP LT 130 MM HG: CPT | Performed by: OBSTETRICS & GYNECOLOGY

## 2022-05-17 PROCEDURE — 11976 REMOVE CONTRACEPTIVE CAPSULE: CPT | Performed by: OBSTETRICS & GYNECOLOGY

## 2022-05-17 PROCEDURE — 3078F DIAST BP <80 MM HG: CPT | Performed by: OBSTETRICS & GYNECOLOGY

## 2022-12-06 ENCOUNTER — TELEPHONE (OUTPATIENT)
Dept: OBGYN CLINIC | Facility: CLINIC | Age: 27
End: 2022-12-06

## 2022-12-06 NOTE — TELEPHONE ENCOUNTER
Patient name and  verified. Patient requesting Depo injection. Patient overdue for annual. Appt scheduled with [facility] for annual and depo injection. Aware of time and location.

## 2022-12-07 ENCOUNTER — OFFICE VISIT (OUTPATIENT)
Dept: OBGYN CLINIC | Facility: CLINIC | Age: 27
End: 2022-12-07
Payer: MEDICAID

## 2022-12-07 VITALS — SYSTOLIC BLOOD PRESSURE: 100 MMHG | WEIGHT: 157 LBS | DIASTOLIC BLOOD PRESSURE: 70 MMHG | BODY MASS INDEX: 30 KG/M2

## 2022-12-07 DIAGNOSIS — Z11.3 SCREENING EXAMINATION FOR SEXUALLY TRANSMITTED DISEASE: ICD-10-CM

## 2022-12-07 DIAGNOSIS — Z01.419 ENCOUNTER FOR WELL WOMAN EXAM WITH ROUTINE GYNECOLOGICAL EXAM: Primary | ICD-10-CM

## 2022-12-07 DIAGNOSIS — N89.8 VAGINAL IRRITATION: ICD-10-CM

## 2022-12-07 DIAGNOSIS — Z12.4 SCREENING FOR MALIGNANT NEOPLASM OF CERVIX: ICD-10-CM

## 2022-12-07 DIAGNOSIS — N89.8 VAGINAL DISCHARGE: ICD-10-CM

## 2022-12-07 DIAGNOSIS — R10.2 PELVIC CRAMPING: ICD-10-CM

## 2022-12-07 DIAGNOSIS — Z30.013 ENCOUNTER FOR PRESCRIPTION FOR DEPO-PROVERA: ICD-10-CM

## 2022-12-07 LAB
BILIRUBIN: NEGATIVE
CONTROL LINE PRESENT WITH A CLEAR BACKGROUND (YES/NO): YES YES/NO
GLUCOSE (URINE DIPSTICK): NEGATIVE MG/DL
KETONES (URINE DIPSTICK): NEGATIVE MG/DL
MULTISTIX LOT#: ABNORMAL NUMERIC
NITRITE, URINE: NEGATIVE
PH, URINE: 7 (ref 4.5–8)
PREGNANCY TEST, URINE: NEGATIVE
PROTEIN (URINE DIPSTICK): NEGATIVE MG/DL
SPECIFIC GRAVITY: 1.01 (ref 1–1.03)
URINE-COLOR: YELLOW
UROBILINOGEN,SEMI-QN: 0.2 MG/DL (ref 0–1.9)

## 2022-12-07 PROCEDURE — 96372 THER/PROPH/DIAG INJ SC/IM: CPT | Performed by: NURSE PRACTITIONER

## 2022-12-07 PROCEDURE — 3078F DIAST BP <80 MM HG: CPT | Performed by: NURSE PRACTITIONER

## 2022-12-07 PROCEDURE — 99395 PREV VISIT EST AGE 18-39: CPT | Performed by: NURSE PRACTITIONER

## 2022-12-07 PROCEDURE — 81025 URINE PREGNANCY TEST: CPT | Performed by: NURSE PRACTITIONER

## 2022-12-07 PROCEDURE — 81002 URINALYSIS NONAUTO W/O SCOPE: CPT | Performed by: NURSE PRACTITIONER

## 2022-12-07 PROCEDURE — 3074F SYST BP LT 130 MM HG: CPT | Performed by: NURSE PRACTITIONER

## 2022-12-07 RX ORDER — MEDROXYPROGESTERONE ACETATE 150 MG/ML
150 INJECTION, SUSPENSION INTRAMUSCULAR ONCE
Status: COMPLETED | OUTPATIENT
Start: 2022-12-07 | End: 2022-12-07

## 2022-12-07 RX ADMIN — MEDROXYPROGESTERONE ACETATE 150 MG: 150 INJECTION, SUSPENSION INTRAMUSCULAR at 10:57:00

## 2022-12-08 LAB
C TRACH DNA SPEC QL NAA+PROBE: NEGATIVE
N GONORRHOEA DNA SPEC QL NAA+PROBE: NEGATIVE

## 2022-12-09 LAB
GENITAL VAGINOSIS SCREEN: POSITIVE
TRICHOMONAS SCREEN: NEGATIVE

## 2022-12-09 RX ORDER — METRONIDAZOLE 500 MG/1
500 TABLET ORAL 2 TIMES DAILY
Qty: 14 TABLET | Refills: 0 | Status: SHIPPED | OUTPATIENT
Start: 2022-12-09 | End: 2022-12-16

## 2022-12-09 NOTE — TELEPHONE ENCOUNTER
Patient name and  verified. Patient informed of [address] result note and recommendations. Patient prefers PO Flagyl. Rx sent to pharmacy. Encouraged to contact office if symptoms persist. Verbalized understanding.

## 2022-12-09 NOTE — TELEPHONE ENCOUNTER
----- Message from VLAD Crocker sent at 12/9/2022  1:37 AM CST -----  Please notify Grisel of her positive bacterial vaginosis result please offer her intravaginal MetroGel or oral metronidazole.   Thank you

## 2022-12-30 ENCOUNTER — TELEPHONE (OUTPATIENT)
Dept: OBGYN CLINIC | Facility: CLINIC | Age: 27
End: 2022-12-30

## 2022-12-30 RX ORDER — SULFAMETHOXAZOLE AND TRIMETHOPRIM 800; 160 MG/1; MG/1
1 TABLET ORAL 2 TIMES DAILY
Qty: 6 TABLET | Refills: 0 | Status: SHIPPED | OUTPATIENT
Start: 2022-12-30 | End: 2023-01-02

## 2022-12-30 NOTE — TELEPHONE ENCOUNTER
Pt stated she has a UTI. It is painful to go to the bathroom and there is an odor. Would like a prescription or appointment if needed. Please call. Needs Mauritian nterpreter.

## 2022-12-30 NOTE — TELEPHONE ENCOUNTER
Patient name and  verified. Patient contacted office complaining of urinary urgency, malodorous urine, and burning with urination that began 1 day ago. Denies fever, chills, pain, or hematuria. Not currently breast feeding or pregnant. Allergies confirmed. Bactrim DS x 3 days sent to pharmacy per office protocol. Advised if symptoms still persist after treatment patient to be seen in office. Verbalized understanding.

## 2023-03-03 ENCOUNTER — NURSE ONLY (OUTPATIENT)
Dept: OBGYN CLINIC | Facility: CLINIC | Age: 28
End: 2023-03-03

## 2023-03-03 DIAGNOSIS — R30.0 DYSURIA: Primary | ICD-10-CM

## 2023-03-03 DIAGNOSIS — Z30.42 DEPO-PROVERA CONTRACEPTIVE STATUS: ICD-10-CM

## 2023-03-03 PROCEDURE — 96372 THER/PROPH/DIAG INJ SC/IM: CPT | Performed by: NURSE PRACTITIONER

## 2023-03-03 RX ORDER — MEDROXYPROGESTERONE ACETATE 150 MG/ML
150 INJECTION, SUSPENSION INTRAMUSCULAR ONCE
Status: COMPLETED | OUTPATIENT
Start: 2023-03-03 | End: 2023-03-03

## 2023-03-03 RX ADMIN — MEDROXYPROGESTERONE ACETATE 150 MG: 150 INJECTION, SUSPENSION INTRAMUSCULAR at 09:43:00

## 2023-03-03 NOTE — PROGRESS NOTES
Patient name and  verified. Patient seen in office for depo 150 injection. Injection given in L deltoid. Injection tolerated well and no adverse reactions noted. Next depo window given (). Patient also complaining of abdominal cramping and dysuria. Order for urine culture and urinalysis entered. Patient encouraged to leave sample with lab now.

## 2023-05-17 ENCOUNTER — TELEPHONE (OUTPATIENT)
Dept: OBGYN CLINIC | Facility: CLINIC | Age: 28
End: 2023-05-17

## 2023-05-17 NOTE — TELEPHONE ENCOUNTER
Patient is unable to keep the appointment 5/19/2023 requesting a nurse to call to reschedule. ...  needed

## 2023-06-28 ENCOUNTER — OFFICE VISIT (OUTPATIENT)
Dept: OBGYN CLINIC | Facility: CLINIC | Age: 28
End: 2023-06-28

## 2023-06-28 ENCOUNTER — LAB ENCOUNTER (OUTPATIENT)
Dept: LAB | Facility: HOSPITAL | Age: 28
End: 2023-06-28
Attending: OBSTETRICS & GYNECOLOGY
Payer: MEDICAID

## 2023-06-28 VITALS — WEIGHT: 172 LBS | BODY MASS INDEX: 33 KG/M2 | SYSTOLIC BLOOD PRESSURE: 112 MMHG | DIASTOLIC BLOOD PRESSURE: 71 MMHG

## 2023-06-28 DIAGNOSIS — N92.0 MENORRHAGIA WITH REGULAR CYCLE: ICD-10-CM

## 2023-06-28 DIAGNOSIS — N92.0 MENORRHAGIA WITH REGULAR CYCLE: Primary | ICD-10-CM

## 2023-06-28 LAB
DEPRECATED RDW RBC AUTO: 41.1 FL (ref 35.1–46.3)
ERYTHROCYTE [DISTWIDTH] IN BLOOD BY AUTOMATED COUNT: 12.6 % (ref 11–15)
HCT VFR BLD AUTO: 36 %
HGB BLD-MCNC: 11.9 G/DL
MCH RBC QN AUTO: 29.7 PG (ref 26–34)
MCHC RBC AUTO-ENTMCNC: 33.1 G/DL (ref 31–37)
MCV RBC AUTO: 89.8 FL
PLATELET # BLD AUTO: 235 10(3)UL (ref 150–450)
RBC # BLD AUTO: 4.01 X10(6)UL
WBC # BLD AUTO: 6.8 X10(3) UL (ref 4–11)

## 2023-06-28 PROCEDURE — 85027 COMPLETE CBC AUTOMATED: CPT

## 2023-06-28 PROCEDURE — 99213 OFFICE O/P EST LOW 20 MIN: CPT | Performed by: OBSTETRICS & GYNECOLOGY

## 2023-06-28 PROCEDURE — 3074F SYST BP LT 130 MM HG: CPT | Performed by: OBSTETRICS & GYNECOLOGY

## 2023-06-28 PROCEDURE — 3078F DIAST BP <80 MM HG: CPT | Performed by: OBSTETRICS & GYNECOLOGY

## 2023-06-28 PROCEDURE — 36415 COLL VENOUS BLD VENIPUNCTURE: CPT

## 2025-01-14 ENCOUNTER — OFFICE VISIT (OUTPATIENT)
Dept: INTERNAL MEDICINE CLINIC | Facility: CLINIC | Age: 30
End: 2025-01-14
Payer: COMMERCIAL

## 2025-01-14 VITALS
BODY MASS INDEX: 32.27 KG/M2 | RESPIRATION RATE: 16 BRPM | WEIGHT: 189 LBS | OXYGEN SATURATION: 98 % | DIASTOLIC BLOOD PRESSURE: 70 MMHG | HEIGHT: 64 IN | HEART RATE: 70 BPM | SYSTOLIC BLOOD PRESSURE: 122 MMHG | TEMPERATURE: 98 F

## 2025-01-14 DIAGNOSIS — Z01.818 PRE-OP EXAMINATION: Primary | ICD-10-CM

## 2025-01-14 DIAGNOSIS — H61.91 LESION OF RIGHT EXTERNAL EAR: ICD-10-CM

## 2025-01-14 DIAGNOSIS — K14.8 LESION OF TONGUE: ICD-10-CM

## 2025-01-14 PROBLEM — D23.21: Status: ACTIVE | Noted: 2024-10-09

## 2025-01-14 LAB
ATRIAL RATE: 58 BPM
P AXIS: 32 DEGREES
P-R INTERVAL: 138 MS
Q-T INTERVAL: 416 MS
QRS DURATION: 98 MS
QTC CALCULATION (BEZET): 408 MS
R AXIS: 42 DEGREES
T AXIS: 20 DEGREES
VENTRICULAR RATE: 58 BPM

## 2025-01-14 PROCEDURE — 99204 OFFICE O/P NEW MOD 45 MIN: CPT | Performed by: PHYSICIAN ASSISTANT

## 2025-01-14 PROCEDURE — 3074F SYST BP LT 130 MM HG: CPT | Performed by: PHYSICIAN ASSISTANT

## 2025-01-14 PROCEDURE — 3078F DIAST BP <80 MM HG: CPT | Performed by: PHYSICIAN ASSISTANT

## 2025-01-14 PROCEDURE — 3008F BODY MASS INDEX DOCD: CPT | Performed by: PHYSICIAN ASSISTANT

## 2025-01-14 PROCEDURE — 93000 ELECTROCARDIOGRAM COMPLETE: CPT | Performed by: PHYSICIAN ASSISTANT

## 2025-01-14 NOTE — H&P
Grisel Sanchez Huerta is a 29 year old year old female who presents for a pre-operative physical exam.  Patient is to have excision of tongue lesion, to be done by Dr Eyad Sellers at [facility] on 1/24/25.    HPI:    Patient is electing to undergo surgery due to lesion on the right posterior tongue > 6 mos that was recommended for biopsy and excision.  Functional capacity: the patient exercises denies any chest pain or shortness of breath on exertion.  States she is able to walk up 2 flights of stairs carrying heavy groceries without difficulty, shortness of breath, or chest pain.  On review of her chart, she has had no recent coronary evaluation.  Today, she has no complaints.  Medications: No current outpatient medications on file.  Allergies: Allergies[1]  PMH:  has a past medical history of Cystitis (01/21/2017), Decorative tattoo, and Dyspareunia (CODE) (09/23/2016).    She has no past medical history of Anesthesia complication, Difficult intubation, Family history of malignant hyperthermia, Family history of pseudocholinesterase deficiency, History of adverse reaction to anesthesia, motion sickness, Malignant hyperthermia, PONV (postoperative nausea and vomiting), or Pseudocholinesterase deficiency.  Surgical Hx:  has no past surgical history on file.  Family Hx: family history includes No Known Problems in her maternal grandfather and maternal grandmother.  Social Hx:   Social History     Socioeconomic History    Marital status:    Tobacco Use    Smoking status: Never    Smokeless tobacco: Never   Vaping Use    Vaping status: Never Used   Substance and Sexual Activity    Alcohol use: No     Alcohol/week: 0.0 standard drinks of alcohol    Drug use: No     Social Drivers of Health      Received from [facility]      REVIEW OF SYSTEMS:    GENERAL: feels well otherwise  SKIN: denies any unusual skin lesions  EYES:denies blurred vision or double vision  HEENT: denies nasal congestion,  sinus pain or sore throat  LUNGS: denies shortness of breath with exertion. Denies history of sleep apnea or COPD  CARDIOVASCULAR: denies chest pain on exertion  GI: denies abdominal pain, denies heartburn  : denies dysuria, hematuria or flank pain  MUSCULOSKELETAL: denies back pain  NEURO: denies headaches, dizziness, fainting  PSYCH: denies depression or anxiety  HEMATOLOGIC: denies hx of anemia, bleeding or clotting disorders  ENDOCRINE: denies thyroid history or diabetes  ALL/ASTHMA: denies hx of allergy or asthma. Denies hx of adverse reaction to anesthesia or analgesics.  EXAM:    /70   Pulse 70   Temp 97.7 °F (36.5 °C)   Resp 16   Ht 5' 4\" (1.626 m)   Wt 189 lb (85.7 kg)   LMP 01/01/2025 (Exact Date)   SpO2 98%   BMI 32.44 kg/m²   GENERAL: well developed, well nourished, in no apparent distress  SKIN: no rashes, no suspicious lesions.  Warm and dry. + 3 tan superficial lesions on R external ear.  HEENT: atraumatic, normocephalic, ears and throat are clear. R tongue posteriorly ~1cm gray nodule   EYES: PERRLA, EOMI, normal optic disk, conjunctiva are clear  NECK: supple, no adenopathy, no bruits b/l  CHEST: no chest tenderness  LUNGS: clear to auscultation b/l  CARDIO: RRR without murmur  GI: good BS's, no masses, HSM or tenderness  MUSCULOSKELETAL: No CVA tenderness, FROM of the back  EXTREMITIES: no cyanosis, clubbing or edema  NEURO: Oriented times three, cranial nerves are intact, motor and sensory are grossly intact. DTRs +2 and symmetric b/l.    ASSESSMENT AND PLAN:    Grisel Sanchez Huerta is a 29 year old female who presents for cardiac risk assessment prior to her excision of tongue lesion, to be done by Dr Eyad Sellers at [facility] on 1/24/25.      Pt has the following conditions:   Patient Active Problem List   Diagnosis    Ear canal papilloma, right    Tongue lesion      Pt has no significant history of cardiac or pulmonary conditions.  EKG performed today in the office shows  sinus bradycardia.  No ST elevation, T wave changes, or evidence of LVH. No prior EKG available for comparison.    The patient has a perioperative risk of major adverse cardiac event that is less than 1%.  She has no clinical markers and an exercise capacity of greater than 4 metabolic units.   Therefore according to ACC/AHA guidelines, she is cleared for surgery with low cardiovascular risk based on combined clinical and surgical risk.  She will complete a BMP as requested.    This consult was sent back to the referring physician, Dr Eyad Sellers.         [1] No Known Allergies

## 2025-01-15 ENCOUNTER — LAB ENCOUNTER (OUTPATIENT)
Dept: LAB | Age: 30
End: 2025-01-15
Attending: PHYSICIAN ASSISTANT
Payer: COMMERCIAL

## 2025-01-15 DIAGNOSIS — Z01.818 PRE-OP EXAMINATION: ICD-10-CM

## 2025-01-15 LAB
ANION GAP SERPL CALC-SCNC: 7 MMOL/L (ref 0–18)
BUN BLD-MCNC: 11 MG/DL (ref 9–23)
CALCIUM BLD-MCNC: 9.8 MG/DL (ref 8.7–10.6)
CHLORIDE SERPL-SCNC: 107 MMOL/L (ref 98–112)
CO2 SERPL-SCNC: 26 MMOL/L (ref 21–32)
CREAT BLD-MCNC: 0.6 MG/DL
EGFRCR SERPLBLD CKD-EPI 2021: 124 ML/MIN/1.73M2 (ref 60–?)
FASTING STATUS PATIENT QL REPORTED: NO
GLUCOSE BLD-MCNC: 89 MG/DL (ref 70–99)
OSMOLALITY SERPL CALC.SUM OF ELEC: 289 MOSM/KG (ref 275–295)
POTASSIUM SERPL-SCNC: 4.4 MMOL/L (ref 3.5–5.1)
SODIUM SERPL-SCNC: 140 MMOL/L (ref 136–145)

## 2025-01-15 PROCEDURE — 80048 BASIC METABOLIC PNL TOTAL CA: CPT | Performed by: PHYSICIAN ASSISTANT

## 2025-01-23 ENCOUNTER — ANESTHESIA EVENT (OUTPATIENT)
Dept: SURGERY | Facility: HOSPITAL | Age: 30
End: 2025-01-23
Payer: COMMERCIAL

## 2025-01-24 ENCOUNTER — HOSPITAL ENCOUNTER (OUTPATIENT)
Facility: HOSPITAL | Age: 30
Setting detail: HOSPITAL OUTPATIENT SURGERY
Discharge: HOME | End: 2025-01-24
Attending: OTOLARYNGOLOGY | Admitting: OTOLARYNGOLOGY
Payer: COMMERCIAL

## 2025-01-24 ENCOUNTER — ANESTHESIA (OUTPATIENT)
Dept: SURGERY | Facility: HOSPITAL | Age: 30
End: 2025-01-24
Payer: COMMERCIAL

## 2025-01-24 VITALS
RESPIRATION RATE: 16 BRPM | OXYGEN SATURATION: 100 % | HEART RATE: 61 BPM | WEIGHT: 186.13 LBS | HEIGHT: 64 IN | TEMPERATURE: 98 F | SYSTOLIC BLOOD PRESSURE: 106 MMHG | BODY MASS INDEX: 31.78 KG/M2 | DIASTOLIC BLOOD PRESSURE: 61 MMHG

## 2025-01-24 LAB — B-HCG UR QL: NEGATIVE

## 2025-01-24 PROCEDURE — 81025 URINE PREGNANCY TEST: CPT

## 2025-01-24 PROCEDURE — 0CB7XZZ EXCISION OF TONGUE, EXTERNAL APPROACH: ICD-10-PCS | Performed by: OTOLARYNGOLOGY

## 2025-01-24 PROCEDURE — 88305 TISSUE EXAM BY PATHOLOGIST: CPT | Performed by: OTOLARYNGOLOGY

## 2025-01-24 RX ORDER — LIDOCAINE HYDROCHLORIDE 10 MG/ML
INJECTION, SOLUTION EPIDURAL; INFILTRATION; INTRACAUDAL; PERINEURAL AS NEEDED
Status: DISCONTINUED | OUTPATIENT
Start: 2025-01-24 | End: 2025-01-24 | Stop reason: SURG

## 2025-01-24 RX ORDER — ACETAMINOPHEN 500 MG
1000 TABLET ORAL ONCE
Status: DISCONTINUED | OUTPATIENT
Start: 2025-01-24 | End: 2025-01-24 | Stop reason: HOSPADM

## 2025-01-24 RX ORDER — ONDANSETRON 2 MG/ML
4 INJECTION INTRAMUSCULAR; INTRAVENOUS EVERY 6 HOURS PRN
Status: DISCONTINUED | OUTPATIENT
Start: 2025-01-24 | End: 2025-01-24

## 2025-01-24 RX ORDER — BUPIVACAINE HYDROCHLORIDE 2.5 MG/ML
INJECTION, SOLUTION EPIDURAL; INFILTRATION; INTRACAUDAL AS NEEDED
Status: DISCONTINUED | OUTPATIENT
Start: 2025-01-24 | End: 2025-01-24 | Stop reason: HOSPADM

## 2025-01-24 RX ORDER — HYDROCODONE BITARTRATE AND ACETAMINOPHEN 5; 325 MG/1; MG/1
2 TABLET ORAL ONCE AS NEEDED
Status: COMPLETED | OUTPATIENT
Start: 2025-01-24 | End: 2025-01-24

## 2025-01-24 RX ORDER — SCOPOLAMINE 1 MG/3D
1 PATCH, EXTENDED RELEASE TRANSDERMAL ONCE
Status: DISCONTINUED | OUTPATIENT
Start: 2025-01-24 | End: 2025-01-24 | Stop reason: HOSPADM

## 2025-01-24 RX ORDER — SODIUM CHLORIDE, SODIUM LACTATE, POTASSIUM CHLORIDE, CALCIUM CHLORIDE 600; 310; 30; 20 MG/100ML; MG/100ML; MG/100ML; MG/100ML
INJECTION, SOLUTION INTRAVENOUS CONTINUOUS
Status: DISCONTINUED | OUTPATIENT
Start: 2025-01-24 | End: 2025-01-24

## 2025-01-24 RX ORDER — ACETAMINOPHEN 500 MG
1000 TABLET ORAL ONCE AS NEEDED
Status: COMPLETED | OUTPATIENT
Start: 2025-01-24 | End: 2025-01-24

## 2025-01-24 RX ORDER — HYDROMORPHONE HYDROCHLORIDE 1 MG/ML
0.6 INJECTION, SOLUTION INTRAMUSCULAR; INTRAVENOUS; SUBCUTANEOUS EVERY 5 MIN PRN
Status: DISCONTINUED | OUTPATIENT
Start: 2025-01-24 | End: 2025-01-24

## 2025-01-24 RX ORDER — MIDAZOLAM HYDROCHLORIDE 1 MG/ML
INJECTION INTRAMUSCULAR; INTRAVENOUS AS NEEDED
Status: DISCONTINUED | OUTPATIENT
Start: 2025-01-24 | End: 2025-01-24 | Stop reason: SURG

## 2025-01-24 RX ORDER — KETOROLAC TROMETHAMINE 30 MG/ML
INJECTION, SOLUTION INTRAMUSCULAR; INTRAVENOUS AS NEEDED
Status: DISCONTINUED | OUTPATIENT
Start: 2025-01-24 | End: 2025-01-24 | Stop reason: SURG

## 2025-01-24 RX ORDER — METOCLOPRAMIDE HYDROCHLORIDE 5 MG/ML
INJECTION INTRAMUSCULAR; INTRAVENOUS AS NEEDED
Status: DISCONTINUED | OUTPATIENT
Start: 2025-01-24 | End: 2025-01-24 | Stop reason: SURG

## 2025-01-24 RX ORDER — ONDANSETRON 2 MG/ML
INJECTION INTRAMUSCULAR; INTRAVENOUS AS NEEDED
Status: DISCONTINUED | OUTPATIENT
Start: 2025-01-24 | End: 2025-01-24 | Stop reason: SURG

## 2025-01-24 RX ORDER — PROCHLORPERAZINE EDISYLATE 5 MG/ML
5 INJECTION INTRAMUSCULAR; INTRAVENOUS EVERY 8 HOURS PRN
Status: DISCONTINUED | OUTPATIENT
Start: 2025-01-24 | End: 2025-01-24

## 2025-01-24 RX ORDER — DEXAMETHASONE SODIUM PHOSPHATE 4 MG/ML
VIAL (ML) INJECTION AS NEEDED
Status: DISCONTINUED | OUTPATIENT
Start: 2025-01-24 | End: 2025-01-24 | Stop reason: SURG

## 2025-01-24 RX ORDER — ROCURONIUM BROMIDE 10 MG/ML
INJECTION, SOLUTION INTRAVENOUS AS NEEDED
Status: DISCONTINUED | OUTPATIENT
Start: 2025-01-24 | End: 2025-01-24 | Stop reason: SURG

## 2025-01-24 RX ORDER — HYDROCODONE BITARTRATE AND ACETAMINOPHEN 5; 325 MG/1; MG/1
1 TABLET ORAL ONCE AS NEEDED
Status: COMPLETED | OUTPATIENT
Start: 2025-01-24 | End: 2025-01-24

## 2025-01-24 RX ORDER — HYDROMORPHONE HYDROCHLORIDE 1 MG/ML
0.4 INJECTION, SOLUTION INTRAMUSCULAR; INTRAVENOUS; SUBCUTANEOUS EVERY 5 MIN PRN
Status: DISCONTINUED | OUTPATIENT
Start: 2025-01-24 | End: 2025-01-24

## 2025-01-24 RX ORDER — HYDROMORPHONE HYDROCHLORIDE 1 MG/ML
0.2 INJECTION, SOLUTION INTRAMUSCULAR; INTRAVENOUS; SUBCUTANEOUS EVERY 5 MIN PRN
Status: DISCONTINUED | OUTPATIENT
Start: 2025-01-24 | End: 2025-01-24

## 2025-01-24 RX ORDER — NALOXONE HYDROCHLORIDE 0.4 MG/ML
0.08 INJECTION, SOLUTION INTRAMUSCULAR; INTRAVENOUS; SUBCUTANEOUS AS NEEDED
Status: DISCONTINUED | OUTPATIENT
Start: 2025-01-24 | End: 2025-01-24

## 2025-01-24 RX ADMIN — LIDOCAINE HYDROCHLORIDE 5 ML: 10 INJECTION, SOLUTION EPIDURAL; INFILTRATION; INTRACAUDAL; PERINEURAL at 09:03:00

## 2025-01-24 RX ADMIN — ROCURONIUM BROMIDE 30 MG: 10 INJECTION, SOLUTION INTRAVENOUS at 09:03:00

## 2025-01-24 RX ADMIN — ONDANSETRON 4 MG: 2 INJECTION INTRAMUSCULAR; INTRAVENOUS at 09:21:00

## 2025-01-24 RX ADMIN — MIDAZOLAM HYDROCHLORIDE 2 MG: 1 INJECTION INTRAMUSCULAR; INTRAVENOUS at 09:00:00

## 2025-01-24 RX ADMIN — DEXAMETHASONE SODIUM PHOSPHATE 4 MG: 4 MG/ML VIAL (ML) INJECTION at 09:10:00

## 2025-01-24 RX ADMIN — KETOROLAC TROMETHAMINE 30 MG: 30 INJECTION, SOLUTION INTRAMUSCULAR; INTRAVENOUS at 09:21:00

## 2025-01-24 RX ADMIN — METOCLOPRAMIDE HYDROCHLORIDE 10 MG: 5 INJECTION INTRAMUSCULAR; INTRAVENOUS at 09:12:00

## 2025-01-24 NOTE — OPERATIVE REPORT
[facility]    Grisel Sanchez Huerta Patient Status:  Hospital Outpatient Surgery    1995 MRN [id number]   Location [facility] POST ANESTHESIA CARE UNIT Attending Eyad Sellers MD   Hosp Day # 0 PCP Randall Moran MD     Tongue Wide Local Excision Op Note  Pre-Op Diagnosis:  Tongue lesion, tongue cancer  Post-Op Diagnosis: Same  Procedure:  #1 Wide local excision of midlin tongue lesion  Surgeon: Verito  Anesthesia: General  Indications for Procedure:  Grisel is a very pleasant female with a history of a tongue lesion.  Here for biopsy  The above-named procedure was offered for definitive treatment.   Procedure in Detail:  Patient taken to the operating room and laid supine on the operating table.   After adequate IV and endotracheal anesthesia, a bite block was placed on the left.  A stay suture was placed in the midline of the tongue for retraction.  The lesion was obviously noted on the posterior midline tongue.  The lesion was excised with 4-5 mm margins around using bovie cautery.  Hemostasis was achieved with bipolar cautery.   The oral cavity was irrigated and suctioned.  The area was left to granulate. All instruments were removed from the oral cavity and nose and the patient was given back to anesthesia and reversed without complications.  The sponge, needle and instrument counts were correct at the end of the case.  There were no complications.  I performed all parts of this procedure.  EBL:  2cc  IVF:  100cc LR  Specimens:  Midline tongue lesion  UO: None  Condition:  To PACU stable  Eyad Sellers MD  2025  9:45 AM

## 2025-01-24 NOTE — ANESTHESIA POSTPROCEDURE EVALUATION
[facility]    Grisel Sanchez Huerta Patient Status:  Hospital Outpatient Surgery   Age/Gender 29 year old female MRN [id number]   Location [facility] POST ANESTHESIA CARE UNIT Attending Eyad Sellers MD   Hosp Day # 0 PCP Randall Moran MD       Anesthesia Post-op Note    EXCISION OF TONGUE LESION  BILATERAL    Procedure Summary       Date: 01/24/25 Room / Location:  MAIN OR 01 / [facility] MAIN OR    Anesthesia Start: 0900 Anesthesia Stop: 0938    Procedure: EXCISION OF TONGUE LESION BILATERAL (Bilateral: Mouth) Diagnosis: (TONGUE LESION)    Surgeons: Eyad Sellers MD Anesthesiologist: Xander Arce MD    Anesthesia Type: general ASA Status: Not recorded            Anesthesia Type: general    Vitals Value Taken Time   /58 01/24/25 0938   Temp 97.6 01/24/25 0938   Pulse 84 01/24/25 0938   Resp 16 01/24/25 0938   SpO2 100 01/24/25 0938           Patient Location: PACU    Anesthesia Type: general    Airway Patency: patent    Postop Pain Control: adequate    Mental Status: preanesthetic baseline    Nausea/Vomiting: none    Cardiopulmonary/Hydration status: stable euvolemic    Complications: no apparent anesthesia related complications    Postop vital signs: stable    Dental Exam: Unchanged from Preop    Patient to be discharged from PACU when criteria met.

## 2025-01-24 NOTE — ANESTHESIA PROCEDURE NOTES
Airway  Date/Time: 1/24/2025 9:07 AM  Urgency: elective      General Information and Staff    Patient location during procedure: OR  Anesthesiologist: Xander Arce MD  Performed: anesthesiologist   Performed by: Xander Arce MD  Authorized by: Xander Arce MD      Indications and Patient Condition  Indications for airway management: anesthesia  Sedation level: deep  Preoxygenated: yes  Patient position: sniffing  Mask difficulty assessment: 1 - vent by mask    Final Airway Details  Final airway type: endotracheal airway      Successful airway: ETT  Cuffed: yes   Successful intubation technique: direct laryngoscopy  Endotracheal tube insertion site: oral  Blade: Mary  Blade size: #3  ETT size (mm): 7.0    Cormack-Lehane Classification: grade I - full view of glottis  Placement verified by: capnometry   Measured from: lips  ETT to lips (cm): 22  Number of attempts at approach: 1

## 2025-01-24 NOTE — DISCHARGE INSTRUCTIONS
Call [facility] at [phone] or if it is after hours ask to have the doctor on call paged if your child has:    * any fresh bleeding from the nose or mouth  * A temperature greater than 102F  * Vomiting that lasts more than 24 hours  * Severe pain that gets worse and is not helped by medicine  * Coughing that will not go away  * Problems drinking fluids for more than 24 hours or in not able to urinate  * Neck pain, stiffness or has a hard time turning their head    Call with any other questions or concerns    Appointments you need to make:  You should make a follow up appointment for 3-4 weeks after surgery.    What to expect:  * You may have throat, ear and jaw pain  * Bad breath  * increased nasal drainage  * mild fever for a few days after surgery    Pain:  * You may have acetaminophen (Tylenol) every 4 to 6 hours or Ibuprofen every 6-8 hours as needed  * Your doctor may prescribe stronger pain medicine, follow your doctor's instructions for taking pain medicines  * If your doctor gives you a stronger pain medicine (roxicet or lortab), please remember that these medications contain acetaminophen (Tylenol) already.  So please do NOT give Roxicet/Lortab and additional acetaminophen (tylenol) at the SAME time.  * If you need additional pain medication, please call the nursing line at [phone] x [phone]    Diet:  * Offer plenty of fluids  * Start with clear liquids (flat white soda, water, broth, apple juice, and popsicles  * If your child is constipated, please use over the counter Miralax    Activity:  * Recovery takes 1-2 weeks.  Avoid rough play, gym, swimming, and contact sports during this time    With any concerns or questions, or ANY bleeding, call and ask for the ENT on call physician          You were given a dose of Norco at: 10:16 AM  Norco contains Tylenol. Do not take any additional Tylenol products until: 2:15 PM        *You have been given a drug called Toradol today at- 9:21 AM    *This  is an Anti-Inflammatory drug in IV form.     *Do not take any additional anti-inflammatory drugs (Ibuprofen, Motrin, Aleve, Naprosyn) ....until after___3:20 PM_____ to prevent overdose.

## 2025-01-24 NOTE — H&P
History and physical by Sanjuana BOONE on 1/14 reviewed and up to date. No changes to H&P.    Plan is excision of tongue lesion

## (undated) DEVICE — SOLUTION IRRIG 1000ML 0.9% NACL USP BTL

## (undated) DEVICE — SUT CHRM GUT 3-0 27IN SH ABSRB UD 26MM 1/2

## (undated) DEVICE — PACK DRAPE HEAD/NECK STER

## (undated) DEVICE — SLEEVE COMPR MD KNEE LEN SGL USE KENDALL SCD

## (undated) DEVICE — E-Z CLEAN, NON-STICK, PTFE COATED, ELECTROSURGICAL NEEDLE ELECTRODE, MODIFIED EXTENDED INSULATION, 2.75 INCH (7 CM): Brand: MEGADYNE

## (undated) DEVICE — GLOVE SUR 7.5 SENSICARE PI PIP CRM PWD F

## (undated) DEVICE — SUT PERMA- 2-0 30IN FSL NABSRB BLK L30MM 3

## (undated) NOTE — LETTER
AUTHORIZATION FOR SURGICAL OPERATION OR OTHER PROCEDURE    1. I hereby authorize Dr. Mari Estrella MD and [place] [facility] staff assigned to my case to perform the following operation and/or procedure at the [facility], [facility]:    _______________________________________________________________________________________________    [place] [employer]  _______________________________________________________________________________________________    2. My physician has explained the nature and purpose of the operation or other procedure, possible alternative methods of treatment, the risks involved, and the possibility of complication to me. I acknowledge that no guarantee has been made as to the result that may be obtained. 3.  I recognize that, during the course of this operation, or other procedure, unforseen conditions may necessitate additional or different procedure than those listed above. I, therefore, further authorize and request that the above named physician, his/her physician assistants or designees perform such procedures as are, in his/her professional opinion, necessary and desirable. 4.  Any tissue or organs removed in the operation or other procedure may be disposed of by and at the discretion of the [facility] and [facility] AT [facility]. 5.  I understand that in the event of a medical emergency, I will be transported by local paramedics to [facility] or other [facility] emergency department. 6.  I certify that I have read and fully understand the above consent to operation and/or other procedure. 7.  I acknowledge that my physician has explained sedation/analgesia administration to me including the risks and benefits. I consent to the administration of sedation/analgesia as may be necessary or desirable in the judgement of my physician.     Witness signature: ___________________________________________________ Date:  ______/______/_____                    Time: ________ A. M.  P.M. Patient Name:  ______________________________________________________  (please print)      Patient signature:  ___________________________________________________             Relationship to Patient:           []  Parent    Responsible person                          []  Spouse  In case of minor or                    [] Other  _____________   Incompetent name:  __________________________________________________                               (please print)      _____________      Responsible person  In case of minor or  Incompetent signature:  _______________________________________________    Statement of Physician  My signature below affirms that prior to the time of the procedure, I have explained to the patient and/or his/her guardian, the risks and benefits involved in the proposed treatment and any reasonable alternative to the proposed treatment. I have also explained the risks and benefits involved in the refusal of the proposed treatment and have answered the patient's questions.                         Date:  ______/______/_______  Provider                      Signature:  __________________________________________________________       Time:  ___________ A.M    P.M.

## (undated) NOTE — LETTER
6/14/2019              [address] [address] [facility] [address]         Dear Felipe Jauregui,    It was a pleasure to see you. Your PAP test was normal.  There is no need for further testing at this time.   I look fo

## (undated) NOTE — MR AVS SNAPSHOT
[facility]  [address] [place] [address] [phone]               Thank you for choosing us for your health care visit with Vera Garcia MD.  We are glad to serve you and happy to provide you with this summary of your visit.

## (undated) NOTE — LETTER
AUTHORIZATION FOR SURGICAL OPERATION OR OTHER PROCEDURE    1. I hereby authorize Dr. Noe De Leon, and [facility], [facility] staff assigned to my case to perform the following operation and/or procedure at the [facility], [facility] [place] [address]    2.   My phy Relationship to Patient:           []  Parent    Responsible person                          []  Spouse  In case of minor or                    [] Other  _____________   Incompetent name:  __________________________________________________

## (undated) NOTE — MR AVS SNAPSHOT
[facility]  [address] [place] [address]  [phone]               Thank you for choosing us for your health care visit with Reuben Sams MD.  We are glad to serve you and happy to provide you with this summary of your visit. PROTEIN (URINE DIPSTICK) NEGATIVE Negative/Trace mg/dL    UROBILINOGEN,SEMI-QN 0.0 0.0 - 1.9 mg/dL    NITRITE, URINE NEGATIVE Negative    LEUKOCYTES TRACE Negative    APPEARANCE CLEAR Clear    URINE-COLOR YELLOW Yellow    [employer] Lot# [id number] [employer]

## (undated) NOTE — MR AVS SNAPSHOT
[facility]  [address] [place] [address] [phone]               Thank you for choosing us for your health care visit with Kiesha Alcazar MD.  We are glad to serve you and happy to provide you with this summary of your visit.

## (undated) NOTE — MR AVS SNAPSHOT
[facility]  [address] [place], [place] [address] [id number] [phone]               Thank you for choosing us for your health care visit with Stoney Underwood MD.  We are glad to serve you and happy to provide you with this summary of your visit.